# Patient Record
Sex: MALE | Race: WHITE | NOT HISPANIC OR LATINO | URBAN - METROPOLITAN AREA
[De-identification: names, ages, dates, MRNs, and addresses within clinical notes are randomized per-mention and may not be internally consistent; named-entity substitution may affect disease eponyms.]

---

## 2020-05-06 PROBLEM — 35489007 DEPRESSION: Status: ACTIVE | Noted: 2020-05-06

## 2020-07-14 ENCOUNTER — OFFICE VISIT (OUTPATIENT)
Dept: URBAN - METROPOLITAN AREA CLINIC 46 | Facility: CLINIC | Age: 35
End: 2020-07-14

## 2020-07-15 ENCOUNTER — OFFICE VISIT (OUTPATIENT)
Dept: URBAN - METROPOLITAN AREA CLINIC 13 | Facility: CLINIC | Age: 35
End: 2020-07-15

## 2020-07-28 ENCOUNTER — OFFICE VISIT (OUTPATIENT)
Dept: URBAN - METROPOLITAN AREA CLINIC 13 | Facility: CLINIC | Age: 35
End: 2020-07-28

## 2020-09-17 ENCOUNTER — OFFICE VISIT (OUTPATIENT)
Dept: URBAN - METROPOLITAN AREA CLINIC 44 | Facility: CLINIC | Age: 35
End: 2020-09-17

## 2020-10-01 ENCOUNTER — OFFICE VISIT (OUTPATIENT)
Dept: URBAN - METROPOLITAN AREA CLINIC 44 | Facility: CLINIC | Age: 35
End: 2020-10-01

## 2020-10-01 ENCOUNTER — OFFICE VISIT (OUTPATIENT)
Dept: URBAN - METROPOLITAN AREA CLINIC 46 | Facility: CLINIC | Age: 35
End: 2020-10-01

## 2020-11-12 ENCOUNTER — OFFICE VISIT (OUTPATIENT)
Dept: URBAN - METROPOLITAN AREA CLINIC 44 | Facility: CLINIC | Age: 35
End: 2020-11-12

## 2021-01-07 ENCOUNTER — OFFICE VISIT (OUTPATIENT)
Dept: URBAN - METROPOLITAN AREA CLINIC 44 | Facility: CLINIC | Age: 36
End: 2021-01-07

## 2021-01-07 ENCOUNTER — OFFICE VISIT (OUTPATIENT)
Dept: URBAN - METROPOLITAN AREA CLINIC 46 | Facility: CLINIC | Age: 36
End: 2021-01-07

## 2021-01-14 ENCOUNTER — OFFICE VISIT (OUTPATIENT)
Dept: URBAN - METROPOLITAN AREA CLINIC 44 | Facility: CLINIC | Age: 36
End: 2021-01-14

## 2021-01-27 ENCOUNTER — OFFICE VISIT (OUTPATIENT)
Dept: URBAN - METROPOLITAN AREA CLINIC 44 | Facility: CLINIC | Age: 36
End: 2021-01-27

## 2021-03-02 ENCOUNTER — OFFICE VISIT (OUTPATIENT)
Dept: URBAN - METROPOLITAN AREA CLINIC 13 | Facility: CLINIC | Age: 36
End: 2021-03-02

## 2021-03-16 ENCOUNTER — OFFICE VISIT (OUTPATIENT)
Dept: URBAN - METROPOLITAN AREA CLINIC 44 | Facility: CLINIC | Age: 36
End: 2021-03-16

## 2021-03-18 ENCOUNTER — OFFICE VISIT (OUTPATIENT)
Dept: URBAN - METROPOLITAN AREA CLINIC 13 | Facility: CLINIC | Age: 36
End: 2021-03-18

## 2021-03-25 ENCOUNTER — OFFICE VISIT (OUTPATIENT)
Dept: URBAN - METROPOLITAN AREA CLINIC 44 | Facility: CLINIC | Age: 36
End: 2021-03-25

## 2021-03-26 ENCOUNTER — OFFICE VISIT (OUTPATIENT)
Dept: URBAN - METROPOLITAN AREA CLINIC 13 | Facility: CLINIC | Age: 36
End: 2021-03-26

## 2021-04-14 ENCOUNTER — OFFICE VISIT (OUTPATIENT)
Dept: URBAN - METROPOLITAN AREA CLINIC 44 | Facility: CLINIC | Age: 36
End: 2021-04-14

## 2021-04-20 ENCOUNTER — OFFICE VISIT (OUTPATIENT)
Dept: URBAN - METROPOLITAN AREA CLINIC 44 | Facility: CLINIC | Age: 36
End: 2021-04-20

## 2021-06-16 ENCOUNTER — OFFICE VISIT (OUTPATIENT)
Dept: URBAN - METROPOLITAN AREA CLINIC 44 | Facility: CLINIC | Age: 36
End: 2021-06-16

## 2021-07-13 ENCOUNTER — OFFICE VISIT (OUTPATIENT)
Dept: URBAN - METROPOLITAN AREA CLINIC 44 | Facility: CLINIC | Age: 36
End: 2021-07-13

## 2021-08-28 ENCOUNTER — TELEPHONE ENCOUNTER (OUTPATIENT)
Dept: URBAN - METROPOLITAN AREA CLINIC 13 | Facility: CLINIC | Age: 36
End: 2021-08-28

## 2021-08-28 RX ORDER — SERTRALINE 50 MG/1
TABLET, FILM COATED ORAL
OUTPATIENT
End: 2020-07-14

## 2021-08-28 RX ORDER — COLESTIPOL HYDROCHLORIDE 1 G/1
TABLET ORAL
OUTPATIENT
Start: 2019-08-08 | End: 2019-08-23

## 2021-08-28 RX ORDER — CIPROFLOXACIN 750 MG/1
TABLET, FILM COATED ORAL
OUTPATIENT
Start: 2020-03-11 | End: 2020-05-06

## 2021-08-28 RX ORDER — DIPHENOXYLATE HYDROCHLORIDE AND ATROPINE SULFATE 2.5; .025 MG/1; MG/1
TABLET ORAL
OUTPATIENT
Start: 2019-08-08 | End: 2019-08-23

## 2021-08-29 ENCOUNTER — TELEPHONE ENCOUNTER (OUTPATIENT)
Dept: URBAN - METROPOLITAN AREA CLINIC 13 | Facility: CLINIC | Age: 36
End: 2021-08-29

## 2021-08-29 RX ORDER — CYANOCOBALAMIN 1000 UG/ML
INJECTION INTRAMUSCULAR; SUBCUTANEOUS
Status: ACTIVE | COMMUNITY

## 2021-08-29 RX ORDER — MULTIVIT-MIN/FOLIC/VIT K/LYCOP 400-300MCG
TABLET ORAL
Status: ACTIVE | COMMUNITY

## 2021-08-29 RX ORDER — PANTOPRAZOLE SODIUM 40 MG/1
TABLET, DELAYED RELEASE ORAL
Status: ACTIVE | COMMUNITY

## 2021-08-29 RX ORDER — PANTOPRAZOLE SODIUM 40 MG/1
TABLET, DELAYED RELEASE ORAL
Status: ACTIVE | COMMUNITY
Start: 2021-03-16

## 2021-08-29 RX ORDER — DIPHENOXYLATE HCL/ATROPINE 2.5-.025MG
TABLET ORAL
Status: ACTIVE | COMMUNITY

## 2021-08-29 RX ORDER — PREDNISONE 10 MG/1
TABLET ORAL
Status: ACTIVE | COMMUNITY
Start: 2021-03-16

## 2021-08-29 RX ORDER — ERGOCALCIFEROL 1.25 MG/1
CAPSULE ORAL
Status: ACTIVE | COMMUNITY

## 2021-08-29 RX ORDER — COLESTIPOL HYDROCHLORIDE 1 G/1
TABLET ORAL
Status: ACTIVE | COMMUNITY

## 2021-08-29 RX ORDER — POTASSIUM CHLORIDE 1500 MG/1
TABLET, FILM COATED, EXTENDED RELEASE ORAL
Status: ACTIVE | COMMUNITY

## 2021-08-29 RX ORDER — VENLAFAXINE 37.5 MG/1
TABLET ORAL
Status: ACTIVE | COMMUNITY

## 2021-09-10 PROBLEM — 50440006 CROHN'S DISEASE OF COLON: Status: ACTIVE | Noted: 2021-09-02

## 2021-09-13 ENCOUNTER — OFFICE VISIT (OUTPATIENT)
Dept: URBAN - METROPOLITAN AREA CLINIC 44 | Facility: CLINIC | Age: 36
End: 2021-09-13

## 2021-09-15 ENCOUNTER — OFFICE VISIT (OUTPATIENT)
Dept: URBAN - METROPOLITAN AREA CLINIC 43 | Facility: CLINIC | Age: 36
End: 2021-09-15
Payer: COMMERCIAL

## 2021-09-15 VITALS
WEIGHT: 165.6 LBS | TEMPERATURE: 97.5 F | SYSTOLIC BLOOD PRESSURE: 96 MMHG | HEIGHT: 73 IN | BODY MASS INDEX: 21.95 KG/M2 | HEART RATE: 61 BPM | RESPIRATION RATE: 18 BRPM | DIASTOLIC BLOOD PRESSURE: 57 MMHG

## 2021-09-15 DIAGNOSIS — K50.80 CROHN'S COLITIS: ICD-10-CM

## 2021-09-15 PROCEDURE — 96413 CHEMO IV INFUSION 1 HR: CPT | Performed by: INTERNAL MEDICINE

## 2021-09-15 RX ORDER — PREDNISONE 10 MG/1
TABLET ORAL
Status: ACTIVE | COMMUNITY
Start: 2021-03-16

## 2021-09-15 RX ORDER — CYANOCOBALAMIN 1000 UG/ML
INJECTION INTRAMUSCULAR; SUBCUTANEOUS
Status: ACTIVE | COMMUNITY

## 2021-09-15 RX ORDER — ERGOCALCIFEROL 1.25 MG/1
CAPSULE ORAL
Status: ACTIVE | COMMUNITY

## 2021-09-15 RX ORDER — DIPHENOXYLATE HCL/ATROPINE 2.5-.025MG
TABLET ORAL
Status: ACTIVE | COMMUNITY

## 2021-09-15 RX ORDER — POTASSIUM CHLORIDE 1500 MG/1
TABLET, FILM COATED, EXTENDED RELEASE ORAL
Status: ACTIVE | COMMUNITY

## 2021-09-15 RX ORDER — PANTOPRAZOLE SODIUM 40 MG/1
TABLET, DELAYED RELEASE ORAL
Status: ACTIVE | COMMUNITY
Start: 2021-03-16

## 2021-09-15 RX ORDER — MULTIVIT-MIN/FOLIC/VIT K/LYCOP 400-300MCG
TABLET ORAL
Status: ACTIVE | COMMUNITY

## 2021-09-15 RX ORDER — VENLAFAXINE 37.5 MG/1
TABLET ORAL
Status: ACTIVE | COMMUNITY

## 2021-09-15 RX ORDER — COLESTIPOL HYDROCHLORIDE 1 G/1
TABLET ORAL
Status: ACTIVE | COMMUNITY

## 2021-09-17 ENCOUNTER — OFFICE VISIT (OUTPATIENT)
Dept: URBAN - METROPOLITAN AREA CLINIC 44 | Facility: CLINIC | Age: 36
End: 2021-09-17

## 2021-09-27 ENCOUNTER — OFFICE VISIT (OUTPATIENT)
Dept: URBAN - METROPOLITAN AREA CLINIC 44 | Facility: CLINIC | Age: 36
End: 2021-09-27
Payer: COMMERCIAL

## 2021-09-27 VITALS
BODY MASS INDEX: 21.76 KG/M2 | HEART RATE: 65 BPM | TEMPERATURE: 99.2 F | WEIGHT: 164.2 LBS | HEIGHT: 73 IN | OXYGEN SATURATION: 98 % | DIASTOLIC BLOOD PRESSURE: 60 MMHG | SYSTOLIC BLOOD PRESSURE: 100 MMHG

## 2021-09-27 DIAGNOSIS — K75.4 AUTOIMMUNE HEPATITIS: ICD-10-CM

## 2021-09-27 DIAGNOSIS — K50.018 CROHN'S DISEASE OF SMALL INTESTINE WITH OTHER COMPLICATION: ICD-10-CM

## 2021-09-27 PROCEDURE — 99214 OFFICE O/P EST MOD 30 MIN: CPT | Performed by: INTERNAL MEDICINE

## 2021-09-27 RX ORDER — POTASSIUM CHLORIDE 1500 MG/1
TABLET, FILM COATED, EXTENDED RELEASE ORAL
Status: ACTIVE | COMMUNITY

## 2021-09-27 RX ORDER — VENLAFAXINE 37.5 MG/1
TABLET ORAL
Status: ON HOLD | COMMUNITY

## 2021-09-27 RX ORDER — ERGOCALCIFEROL 1.25 MG/1
CAPSULE ORAL
Status: ACTIVE | COMMUNITY

## 2021-09-27 RX ORDER — COLESTIPOL HYDROCHLORIDE 1 G/1
TABLET ORAL
Status: ON HOLD | COMMUNITY

## 2021-09-27 RX ORDER — CYANOCOBALAMIN 1000 UG/ML
INJECTION INTRAMUSCULAR; SUBCUTANEOUS
Status: ACTIVE | COMMUNITY

## 2021-09-27 RX ORDER — PREDNISONE 10 MG/1
TABLET ORAL
Status: ON HOLD | COMMUNITY
Start: 2021-03-16

## 2021-09-27 RX ORDER — PREDNISONE 10 MG/1
1 TABLET TABLET ORAL ONCE A DAY
Qty: 30 | OUTPATIENT
Start: 2021-09-27 | End: 2021-10-27

## 2021-09-27 RX ORDER — MULTIVIT-MIN/FOLIC/VIT K/LYCOP 400-300MCG
TABLET ORAL
Status: ON HOLD | COMMUNITY

## 2021-09-27 RX ORDER — DIPHENOXYLATE HCL/ATROPINE 2.5-.025MG
TABLET ORAL
Status: ON HOLD | COMMUNITY

## 2021-09-27 RX ORDER — PANTOPRAZOLE SODIUM 40 MG/1
TABLET, DELAYED RELEASE ORAL
Status: ON HOLD | COMMUNITY
Start: 2021-03-16

## 2021-09-27 NOTE — HPI-TODAY'S VISIT:
36 yo P with a ?history of AIH and Crohn's disease, and medical non-compliance. The patient was admitted in 6/28/19 to [facility] for possible Crohn's flare. He began a work up at [place] prior to most recent insurance change and we do not have all of his records. His hepatologist was Jeffry at [place]. ASMA, MADISON were completed but no IgG was done. He reported prior liver biopsy that confirmed AIH but he did not take steroids or Imuran. History of SB resection in 2007 and 2010 and had a subtotal colectomy with ileostomy and rectal pouch in 2014. The patient states he had a flexible sigmoidoscopy in 2017 but we do not have these records. There was a period of time in which he was off of all IBD medications, but now on Entyvio. History of being on Remicade and Cimzia in the past. The patient has had elevated LFTs as well as Gilbert's in the past. Labs per [facility] (2017) include a negative viral hepatitis (A/B/C/E), CMV, EBV, HSV. At that time he had an unremarkable MRCP which only showed mild splenomegaly. 6/2019 US Doppler of liver and spleen showed unremarkable liver and mild splenomegaly. Most recently, he has been evaluated by Dr. Light previously and on his most recent appointment with him he questioned the dx of AIH.   The patient expressed frustration as he feels he has an unclear diagnosis regarding the liver disease; not on any meds specifically for this at this time.     EGD/Flex sigmoidoscopy 8/2019 revealed a small ulcer in the small bowel; biopsies with active inflammation.  In addition, he has been on multiple steroid tapers with He also has had multiple ER visits with admission for diffuse abdominal pain and pancreatitis (lipase and epigastric pain); last ER visit 10/2020. EUS with Dr. Sebastien Tellez was grossly unremarkable (idiopathic pancreatitis).   He reports that about a week or so ago, he had a couple of days of N/V which made him feel dehydrated. He continues to feel week, but appetite is improving.  At first, he thought he may have been having a PSBO. He denies any abdominal pain today, but has had some off and on. Stool output was increased, but seems to be returning to baseline; denies blood in the stool. He lost a few pounds with the attack of vomiting, but overall has been pretty stable. He has used Aleve occasionally for joint pain, but no regular NSAID use.  Overall, Entyvio has been working pretty well for him, and he is continues his Q8 schedule.

## 2021-11-19 ENCOUNTER — OFFICE VISIT (OUTPATIENT)
Dept: URBAN - METROPOLITAN AREA CLINIC 44 | Facility: CLINIC | Age: 36
End: 2021-11-19

## 2022-01-19 ENCOUNTER — OFFICE VISIT (OUTPATIENT)
Dept: URBAN - METROPOLITAN AREA CLINIC 43 | Facility: CLINIC | Age: 37
End: 2022-01-19
Payer: COMMERCIAL

## 2022-01-19 VITALS
DIASTOLIC BLOOD PRESSURE: 55 MMHG | RESPIRATION RATE: 18 BRPM | WEIGHT: 166.4 LBS | HEIGHT: 73 IN | HEART RATE: 67 BPM | BODY MASS INDEX: 22.05 KG/M2 | TEMPERATURE: 98.8 F | SYSTOLIC BLOOD PRESSURE: 102 MMHG

## 2022-01-19 DIAGNOSIS — K50.80 CROHN'S COLITIS: ICD-10-CM

## 2022-01-19 PROCEDURE — 96413 CHEMO IV INFUSION 1 HR: CPT | Performed by: INTERNAL MEDICINE

## 2022-01-19 RX ORDER — COLESTIPOL HYDROCHLORIDE 1 G/1
TABLET ORAL
Status: ON HOLD | COMMUNITY

## 2022-01-19 RX ORDER — MULTIVIT-MIN/FOLIC/VIT K/LYCOP 400-300MCG
TABLET ORAL
Status: ON HOLD | COMMUNITY

## 2022-01-19 RX ORDER — CYANOCOBALAMIN 1000 UG/ML
INJECTION INTRAMUSCULAR; SUBCUTANEOUS
Status: ACTIVE | COMMUNITY

## 2022-01-19 RX ORDER — DIPHENOXYLATE HCL/ATROPINE 2.5-.025MG
TABLET ORAL
Status: ON HOLD | COMMUNITY

## 2022-01-19 RX ORDER — POTASSIUM CHLORIDE 1500 MG/1
TABLET, FILM COATED, EXTENDED RELEASE ORAL
Status: ACTIVE | COMMUNITY

## 2022-01-19 RX ORDER — ERGOCALCIFEROL 1.25 MG/1
CAPSULE ORAL
Status: ACTIVE | COMMUNITY

## 2022-01-19 RX ORDER — PANTOPRAZOLE SODIUM 40 MG/1
TABLET, DELAYED RELEASE ORAL
Status: ON HOLD | COMMUNITY
Start: 2021-03-16

## 2022-01-19 RX ORDER — VENLAFAXINE 37.5 MG/1
TABLET ORAL
Status: ON HOLD | COMMUNITY

## 2022-01-19 RX ORDER — PREDNISONE 10 MG/1
TABLET ORAL
Status: ON HOLD | COMMUNITY
Start: 2021-03-16

## 2022-01-24 ENCOUNTER — WEB ENCOUNTER (OUTPATIENT)
Dept: URBAN - METROPOLITAN AREA CLINIC 44 | Facility: CLINIC | Age: 37
End: 2022-01-24

## 2022-02-09 ENCOUNTER — WEB ENCOUNTER (OUTPATIENT)
Dept: URBAN - METROPOLITAN AREA CLINIC 44 | Facility: CLINIC | Age: 37
End: 2022-02-09

## 2022-02-11 ENCOUNTER — OFFICE VISIT (OUTPATIENT)
Dept: URBAN - METROPOLITAN AREA CLINIC 48 | Facility: CLINIC | Age: 37
End: 2022-02-11
Payer: COMMERCIAL

## 2022-02-11 ENCOUNTER — LAB OUTSIDE AN ENCOUNTER (OUTPATIENT)
Dept: URBAN - METROPOLITAN AREA CLINIC 48 | Facility: CLINIC | Age: 37
End: 2022-02-11

## 2022-02-11 VITALS
WEIGHT: 156.2 LBS | HEIGHT: 73 IN | SYSTOLIC BLOOD PRESSURE: 118 MMHG | BODY MASS INDEX: 20.7 KG/M2 | OXYGEN SATURATION: 98 % | HEART RATE: 87 BPM | TEMPERATURE: 99.4 F | DIASTOLIC BLOOD PRESSURE: 80 MMHG

## 2022-02-11 DIAGNOSIS — R10.9 LEFT SIDED ABDOMINAL PAIN: ICD-10-CM

## 2022-02-11 DIAGNOSIS — R63.4 WEIGHT LOSS: ICD-10-CM

## 2022-02-11 DIAGNOSIS — R11.14 BILIOUS VOMITING WITH NAUSEA: ICD-10-CM

## 2022-02-11 DIAGNOSIS — K50.018 CROHN'S DISEASE OF SMALL INTESTINE WITH OTHER COMPLICATION: ICD-10-CM

## 2022-02-11 DIAGNOSIS — R19.8 ALTERED BOWEL FUNCTION: ICD-10-CM

## 2022-02-11 DIAGNOSIS — K75.4 AUTOIMMUNE HEPATITIS: ICD-10-CM

## 2022-02-11 PROCEDURE — 99215 OFFICE O/P EST HI 40 MIN: CPT | Performed by: PHYSICIAN ASSISTANT

## 2022-02-11 RX ORDER — ERGOCALCIFEROL 1.25 MG/1
CAPSULE ORAL
Status: ACTIVE | COMMUNITY

## 2022-02-11 RX ORDER — MULTIVIT-MIN/FOLIC/VIT K/LYCOP 400-300MCG
TABLET ORAL
Status: ACTIVE | COMMUNITY

## 2022-02-11 RX ORDER — METRONIDAZOLE 500 MG/1
1 TABLET TABLET ORAL THREE TIMES A DAY
Qty: 30 | OUTPATIENT
Start: 2022-02-11 | End: 2022-02-21

## 2022-02-11 RX ORDER — POTASSIUM CHLORIDE 1500 MG/1
TABLET, FILM COATED, EXTENDED RELEASE ORAL
Status: ACTIVE | COMMUNITY

## 2022-02-11 RX ORDER — COLESTIPOL HYDROCHLORIDE 1 G/1
TABLET ORAL
Status: ON HOLD | COMMUNITY

## 2022-02-11 RX ORDER — VENLAFAXINE 37.5 MG/1
TABLET ORAL
Status: ON HOLD | COMMUNITY

## 2022-02-11 RX ORDER — PREDNISONE 10 MG/1
AS DIRECTED TABLET ORAL DAILY
Qty: 40 | Refills: 1 | OUTPATIENT
Start: 2022-02-11 | End: 2022-03-15

## 2022-02-11 RX ORDER — CYANOCOBALAMIN 1000 UG/ML
INJECTION INTRAMUSCULAR; SUBCUTANEOUS
Status: ACTIVE | COMMUNITY

## 2022-02-11 RX ORDER — PANTOPRAZOLE SODIUM 40 MG/1
TABLET, DELAYED RELEASE ORAL
Status: ON HOLD | COMMUNITY
Start: 2021-03-16

## 2022-02-11 RX ORDER — DIPHENOXYLATE HCL/ATROPINE 2.5-.025MG
TABLET ORAL
Status: ON HOLD | COMMUNITY

## 2022-02-11 RX ORDER — PANTOPRAZOLE SODIUM 40 MG/1
1 TABLET TABLET, DELAYED RELEASE ORAL
Qty: 30 | Refills: 3 | OUTPATIENT
Start: 2022-02-11

## 2022-02-11 RX ORDER — LACTOSE-REDUCED FOOD
AS DIRECTED LIQUID (ML) ORAL
Status: ACTIVE | COMMUNITY

## 2022-02-11 RX ORDER — CIPROFLOXACIN HYDROCHLORIDE 500 MG/1
1 TABLET TABLET, FILM COATED ORAL
Qty: 20 | OUTPATIENT
Start: 2022-02-11 | End: 2022-02-21

## 2022-02-11 RX ORDER — PREDNISONE 10 MG/1
TABLET ORAL
Status: ON HOLD | COMMUNITY
Start: 2021-03-16

## 2022-02-11 NOTE — HPI-TODAY'S VISIT:
37 y/o P with a ?history of AIH and Crohn's disease, and medical non-compliance. The patient was admitted in 6/28/19 to [facility] for possible Crohn's flare. He began a work up at [place] prior to most recent insurance change and we do not have all of his records. His hepatologist was Jeffry at [place]. Prior ASMA (2/2020), MADISON  and Ceruloplasmin (8/2017) and IgG (6/2021) have all been negative. He reported prior liver biopsy that confirmed AIH but he did not take steroids or Imuran; path results in EPIC chart from 2/2020 showed liver core biopsy with minimal portal chronic inflammation; bile ducts intact; minimal steatosis (less than 5%), no fibrosis appreciated; minimal iron within Kupffer cells; no lobular inflammation, hepatocecullar ballooning degeneration or necrosis.   History of SB resection in 2007 and 2010 and had a subtotal colectomy with ileostomy and rectal pouch in 2014. There was a period of time in which he was off of all IBD medications, but now on Entyvio. History of being on Remicade and Cimzia in the past. The patient has had elevated LFTs as well as Gilbert's in the past. Labs per [facility] (2017) include a negative viral hepatitis (A/B/C/E), CMV, EBV, HSV. At that time he had an unremarkable MRCP which only showed mild splenomegaly. 6/2019 US Doppler of liver and spleen showed unremarkable liver and mild splenomegaly. He was then evaluated by Dr. Light and the patient reported he questioned the dx of AIH.   EGD/Flex sigmoidoscopy 8/2019 revealed a small ulcer in the small bowel; biopsies with active inflammation.  In addition, he has been on multiple steroid tapers with He also has had multiple ER visits with admission for diffuse abdominal pain and pancreatitis (lipase and epigastric pain). EUS with Dr. Sebastien Tellez was grossly unremarkable (idiopathic pancreatitis).   He was seen in September in the office last and at that time he weighed 164 pounds. He was having some N/V and fatigue. Drug levels and antibodies for Entyvio were ordered, as well as iron studies which he did not have done. He was given 10 mg Prednisone daily for 30 days. He returns today and states he did well for about two months, and since December has been having episodic attacks of self limited, but severe N/V and left sided abdominal pain, usually lasting a few days at a time. He is feeling weak and fatigued. He has recently had more stool output than he was with the vomiting, but stool is very foul smelling; no blood. He had labs drawn 2/3 with a normal CRP of 1, normal CBC, and normal CMP aside from stable TBili of 3.1 and Alk phos of 135. Today he weighs 156 pounds. He has had no recent abdominal imaging and a CT enterography was ordered last year in March which he did not have done.

## 2022-02-11 NOTE — PHYSICAL EXAM GASTROINTESTINAL
Abdomen , soft, nontender, nondistended , no guarding or rigidity , no masses palpable , hyperactive bowel sounds , Liver and Spleen , no hepatomegaly present , no hepatosplenomegaly , liver nontender , spleen not palpable , Rectal , normal sphincter tone , no internal hemorrhoids, rectal masses or bleeding present

## 2022-02-15 ENCOUNTER — TELEPHONE ENCOUNTER (OUTPATIENT)
Dept: URBAN - METROPOLITAN AREA CLINIC 40 | Facility: CLINIC | Age: 37
End: 2022-02-15

## 2022-02-21 ENCOUNTER — WEB ENCOUNTER (OUTPATIENT)
Dept: URBAN - METROPOLITAN AREA CLINIC 44 | Facility: CLINIC | Age: 37
End: 2022-02-21

## 2022-03-03 LAB
ANTI-VEDOLIZUMAB ANTIBODY: <25
VEDOLIZUMAB: 16

## 2022-03-07 ENCOUNTER — TELEPHONE ENCOUNTER (OUTPATIENT)
Dept: URBAN - METROPOLITAN AREA CLINIC 48 | Facility: CLINIC | Age: 37
End: 2022-03-07

## 2022-03-11 ENCOUNTER — CLAIMS CREATED FROM THE CLAIM WINDOW (OUTPATIENT)
Dept: URBAN - METROPOLITAN AREA CLINIC 46 | Facility: CLINIC | Age: 37
End: 2022-03-11
Payer: COMMERCIAL

## 2022-03-11 VITALS
WEIGHT: 155.6 LBS | DIASTOLIC BLOOD PRESSURE: 71 MMHG | SYSTOLIC BLOOD PRESSURE: 115 MMHG | HEART RATE: 66 BPM | OXYGEN SATURATION: 99 % | BODY MASS INDEX: 20.62 KG/M2 | HEIGHT: 73 IN | TEMPERATURE: 98.7 F

## 2022-03-11 DIAGNOSIS — K50.018 CROHN'S DISEASE OF SMALL INTESTINE WITH OTHER COMPLICATION: ICD-10-CM

## 2022-03-11 DIAGNOSIS — K75.4 AUTOIMMUNE HEPATITIS: ICD-10-CM

## 2022-03-11 DIAGNOSIS — R93.5 ABNORMAL CT OF THE ABDOMEN: ICD-10-CM

## 2022-03-11 DIAGNOSIS — R10.9 LEFT SIDED ABDOMINAL PAIN: ICD-10-CM

## 2022-03-11 DIAGNOSIS — R11.14 BILIOUS VOMITING WITH NAUSEA: ICD-10-CM

## 2022-03-11 PROCEDURE — 99214 OFFICE O/P EST MOD 30 MIN: CPT | Performed by: INTERNAL MEDICINE

## 2022-03-11 PROCEDURE — 99214 OFFICE O/P EST MOD 30 MIN: CPT | Performed by: PHYSICIAN ASSISTANT

## 2022-03-11 RX ORDER — MULTIVIT-MIN/FOLIC/VIT K/LYCOP 400-300MCG
TABLET ORAL
Status: ACTIVE | COMMUNITY

## 2022-03-11 RX ORDER — CYANOCOBALAMIN 1000 UG/ML
INJECTION INTRAMUSCULAR; SUBCUTANEOUS
Status: ACTIVE | COMMUNITY

## 2022-03-11 RX ORDER — VENLAFAXINE 37.5 MG/1
TABLET ORAL
Status: ACTIVE | COMMUNITY

## 2022-03-11 RX ORDER — PANTOPRAZOLE SODIUM 40 MG/1
1 TABLET TABLET, DELAYED RELEASE ORAL
Qty: 30 | Refills: 3 | Status: ACTIVE | COMMUNITY
Start: 2022-02-11

## 2022-03-11 RX ORDER — LACTOSE-REDUCED FOOD
AS DIRECTED LIQUID (ML) ORAL
Status: ACTIVE | COMMUNITY

## 2022-03-11 RX ORDER — PREDNISONE 10 MG/1
AS DIRECTED TABLET ORAL DAILY
Qty: 40 | Refills: 1 | Status: ACTIVE | COMMUNITY
Start: 2022-02-11 | End: 2022-03-15

## 2022-03-11 RX ORDER — POTASSIUM CHLORIDE 1500 MG/1
TABLET, FILM COATED, EXTENDED RELEASE ORAL
Status: ACTIVE | COMMUNITY

## 2022-03-11 RX ORDER — ERGOCALCIFEROL 1.25 MG/1
CAPSULE ORAL
Status: ACTIVE | COMMUNITY

## 2022-03-11 RX ORDER — PANTOPRAZOLE SODIUM 40 MG/1
1 TABLET TABLET, DELAYED RELEASE ORAL
OUTPATIENT
Start: 2022-02-11

## 2022-03-11 NOTE — PHYSICAL EXAM CONSTITUTIONAL:
well developed, thin , in no acute distress , ambulating without difficulty , normal communication ability

## 2022-03-11 NOTE — HPI-TODAY'S VISIT:
37 y/o P with a ?history of AIH and Crohn's disease, and medical non-compliance. The patient was admitted in 6/28/19 to [facility] for possible Crohn's flare. He began a work up at [place] prior to most recent insurance change and we do not have all of his records. His hepatologist was Jeffry at [place]. Prior ASMA (2/2020), MADISON  and Ceruloplasmin (8/2017) and IgG (6/2021) have all been negative. He reported prior liver biopsy that confirmed AIH but he did not take steroids or Imuran; path results in EPIC chart from 2/2020 showed liver core biopsy with minimal portal chronic inflammation; bile ducts intact; minimal steatosis (less than 5%), no fibrosis appreciated; minimal iron within Kupffer cells; no lobular inflammation, hepatocecullar ballooning degeneration or necrosis.   History of SB resection in 2007 and 2010 and had a subtotal colectomy with ileostomy and rectal pouch in 2014. There was a period of time in which he was off of all IBD medications, but now on Entyvio. History of being on Remicade and Cimzia in the past. The patient has had elevated LFTs as well as Gilbert's in the past. Labs per [facility] (2017) include a negative viral hepatitis (A/B/C/E), CMV, EBV, HSV. At that time he had an unremarkable MRCP which only showed mild splenomegaly. 6/2019 US Doppler of liver and spleen showed unremarkable liver and mild splenomegaly. He was then evaluated by Dr. Light and the patient reported he questioned the dx of AIH.   EGD/Flex sigmoidoscopy 8/2019 revealed a small ulcer in the small bowel; biopsies with active inflammation.  In addition, he has been on multiple steroid tapers with He also has had multiple ER visits with admission for diffuse abdominal pain and pancreatitis (lipase and epigastric pain). EUS with Dr. Sebastien Tellez was grossly unremarkable (idiopathic pancreatitis).   He was seen in September in the office last and at that time he weighed 164 pounds. He was having some N/V and fatigue. Drug levels and antibodies for Entyvio were ordered, as well as iron studies which he did not have done. He was given 10 mg Prednisone daily for 30 days. At last visit 2/11/22 he reported that he well for about two months, and since December has been having episodic attacks of self limited, but severe N/V and left sided abdominal pain, usually lasting a few days at a time. He is feeling weak and fatigued. He has recently had more stool output than he was with the vomiting, but stool is very foul smelling; no blood. He had labs drawn 2/3 with a normal CRP of 1, normal CBC, and normal CMP aside from stable TBili of 3.1 and Alk phos of 135. Today he weighs 156 pounds. He has had no recent abdominal imaging and a CT enterography was ordered last year in March which he did not have done. He was given Cipro/Flagyl, and a Prednisone taper.  Stool studies were checked and negative for pathogens; fecal calpro and panc elastase normal. Stelara drug levels were good and no Ab. CTE showed segements of thickening in the SB upstream from the colostomy site, measuring 6 cm in length. He returns today and reports no significant change. Still having N/V, abdominal pain, and intermittent foul smelling diarrhea.

## 2022-03-16 ENCOUNTER — OFFICE VISIT (OUTPATIENT)
Dept: URBAN - METROPOLITAN AREA SURGERY CENTER 28 | Facility: SURGERY CENTER | Age: 37
End: 2022-03-16
Payer: COMMERCIAL

## 2022-03-16 ENCOUNTER — TELEPHONE ENCOUNTER (OUTPATIENT)
Dept: URBAN - METROPOLITAN AREA CLINIC 92 | Facility: CLINIC | Age: 37
End: 2022-03-16

## 2022-03-16 DIAGNOSIS — K63.3 APHTHOUS ULCER OF COLON: ICD-10-CM

## 2022-03-16 DIAGNOSIS — K50.012 CROHN'S DISEASE OF DUODENUM WITH INTESTINAL OBSTRUCTION: ICD-10-CM

## 2022-03-16 DIAGNOSIS — R10.13 ABDOMINAL DISCOMFORT, EPIGASTRIC: ICD-10-CM

## 2022-03-16 DIAGNOSIS — K62.4 ACQUIRED ANAL STENOSIS: ICD-10-CM

## 2022-03-16 PROCEDURE — 45378 DIAGNOSTIC COLONOSCOPY: CPT | Performed by: INTERNAL MEDICINE

## 2022-03-16 PROCEDURE — 43235 EGD DIAGNOSTIC BRUSH WASH: CPT | Performed by: INTERNAL MEDICINE

## 2022-03-16 PROCEDURE — G8907 PT DOC NO EVENTS ON DISCHARG: HCPCS | Performed by: INTERNAL MEDICINE

## 2022-03-16 RX ORDER — PANTOPRAZOLE SODIUM 40 MG/1
1 TABLET TABLET, DELAYED RELEASE ORAL
Status: ACTIVE | COMMUNITY
Start: 2022-02-11

## 2022-03-16 RX ORDER — MULTIVIT-MIN/FOLIC/VIT K/LYCOP 400-300MCG
TABLET ORAL
Status: ACTIVE | COMMUNITY

## 2022-03-16 RX ORDER — CYANOCOBALAMIN 1000 UG/ML
INJECTION INTRAMUSCULAR; SUBCUTANEOUS
Status: ACTIVE | COMMUNITY

## 2022-03-16 RX ORDER — ERGOCALCIFEROL 1.25 MG/1
CAPSULE ORAL
Status: ACTIVE | COMMUNITY

## 2022-03-16 RX ORDER — LACTOSE-REDUCED FOOD
AS DIRECTED LIQUID (ML) ORAL
Status: ACTIVE | COMMUNITY

## 2022-03-16 RX ORDER — PREDNISONE 10 MG/1
4 TABLETS AS DIRECTED TABLET ORAL
Qty: 50 | Refills: 0 | OUTPATIENT
Start: 2022-03-16

## 2022-03-16 RX ORDER — POTASSIUM CHLORIDE 1500 MG/1
TABLET, FILM COATED, EXTENDED RELEASE ORAL
Status: ACTIVE | COMMUNITY

## 2022-03-16 RX ORDER — VENLAFAXINE 37.5 MG/1
TABLET ORAL
Status: ACTIVE | COMMUNITY

## 2022-03-22 ENCOUNTER — OFFICE VISIT (OUTPATIENT)
Dept: URBAN - METROPOLITAN AREA CLINIC 43 | Facility: CLINIC | Age: 37
End: 2022-03-22
Payer: COMMERCIAL

## 2022-03-22 VITALS
TEMPERATURE: 99 F | RESPIRATION RATE: 18 BRPM | SYSTOLIC BLOOD PRESSURE: 126 MMHG | HEART RATE: 82 BPM | HEIGHT: 73 IN | BODY MASS INDEX: 20.49 KG/M2 | WEIGHT: 154.6 LBS | DIASTOLIC BLOOD PRESSURE: 84 MMHG

## 2022-03-22 DIAGNOSIS — K50.80 CROHN'S COLITIS: ICD-10-CM

## 2022-03-22 PROCEDURE — 96413 CHEMO IV INFUSION 1 HR: CPT | Performed by: INTERNAL MEDICINE

## 2022-03-22 RX ORDER — POTASSIUM CHLORIDE 1500 MG/1
TABLET, FILM COATED, EXTENDED RELEASE ORAL
Status: ACTIVE | COMMUNITY

## 2022-03-22 RX ORDER — PREDNISONE 10 MG/1
4 TABLETS AS DIRECTED TABLET ORAL
Qty: 50 | Refills: 0 | Status: ACTIVE | COMMUNITY
Start: 2022-03-16

## 2022-03-22 RX ORDER — CYANOCOBALAMIN 1000 UG/ML
INJECTION INTRAMUSCULAR; SUBCUTANEOUS
Status: ACTIVE | COMMUNITY

## 2022-03-22 RX ORDER — MULTIVIT-MIN/FOLIC/VIT K/LYCOP 400-300MCG
TABLET ORAL
Status: ACTIVE | COMMUNITY

## 2022-03-22 RX ORDER — LACTOSE-REDUCED FOOD
AS DIRECTED LIQUID (ML) ORAL
Status: ACTIVE | COMMUNITY

## 2022-03-22 RX ORDER — ERGOCALCIFEROL 1.25 MG/1
CAPSULE ORAL
Status: ACTIVE | COMMUNITY

## 2022-03-22 RX ORDER — PANTOPRAZOLE SODIUM 40 MG/1
1 TABLET TABLET, DELAYED RELEASE ORAL
Status: ACTIVE | COMMUNITY
Start: 2022-02-11

## 2022-03-22 RX ORDER — VENLAFAXINE 37.5 MG/1
TABLET ORAL
Status: ACTIVE | COMMUNITY

## 2022-04-04 ENCOUNTER — TELEPHONE ENCOUNTER (OUTPATIENT)
Dept: URBAN - METROPOLITAN AREA CLINIC 46 | Facility: CLINIC | Age: 37
End: 2022-04-04

## 2022-04-06 ENCOUNTER — TELEPHONE ENCOUNTER (OUTPATIENT)
Dept: URBAN - METROPOLITAN AREA CLINIC 23 | Facility: CLINIC | Age: 37
End: 2022-04-06

## 2022-04-13 ENCOUNTER — LAB OUTSIDE AN ENCOUNTER (OUTPATIENT)
Dept: URBAN - METROPOLITAN AREA CLINIC 118 | Facility: CLINIC | Age: 37
End: 2022-04-13

## 2022-04-13 ENCOUNTER — OFFICE VISIT (OUTPATIENT)
Dept: URBAN - METROPOLITAN AREA CLINIC 118 | Facility: CLINIC | Age: 37
End: 2022-04-13

## 2022-04-13 ENCOUNTER — OFFICE VISIT (OUTPATIENT)
Dept: URBAN - METROPOLITAN AREA CLINIC 118 | Facility: CLINIC | Age: 37
End: 2022-04-13
Payer: COMMERCIAL

## 2022-04-13 VITALS
BODY MASS INDEX: 18.53 KG/M2 | WEIGHT: 139.8 LBS | DIASTOLIC BLOOD PRESSURE: 69 MMHG | SYSTOLIC BLOOD PRESSURE: 105 MMHG | HEART RATE: 62 BPM | HEIGHT: 73 IN | TEMPERATURE: 98.6 F

## 2022-04-13 DIAGNOSIS — R10.11 RUQ ABDOMINAL PAIN: ICD-10-CM

## 2022-04-13 DIAGNOSIS — R74.01 TRANSAMINITIS: ICD-10-CM

## 2022-04-13 DIAGNOSIS — E80.6 HYPERBILIRUBINEMIA: ICD-10-CM

## 2022-04-13 DIAGNOSIS — K50.018 CROHN'S DISEASE OF SMALL INTESTINE WITH OTHER COMPLICATION: ICD-10-CM

## 2022-04-13 PROBLEM — 14783006 HYPERBILIRUBINEMIA: Status: ACTIVE | Noted: 2022-04-13

## 2022-04-13 PROCEDURE — 99215 OFFICE O/P EST HI 40 MIN: CPT | Performed by: INTERNAL MEDICINE

## 2022-04-13 RX ORDER — LACTOSE-REDUCED FOOD
AS DIRECTED LIQUID (ML) ORAL
COMMUNITY

## 2022-04-13 RX ORDER — SERTRALINE HYDROCHLORIDE 50 MG/1
1 TABLET TABLET, FILM COATED ORAL ONCE A DAY
Status: ACTIVE | COMMUNITY

## 2022-04-13 RX ORDER — ERGOCALCIFEROL 1.25 MG/1
CAPSULE ORAL
COMMUNITY

## 2022-04-13 RX ORDER — CHOLECALCIFEROL TAB 50 MCG (2000 UNIT) 50 MCG
ONCE PER WEEK TAB ORAL
Status: ACTIVE | COMMUNITY

## 2022-04-13 RX ORDER — POTASSIUM CHLORIDE 1500 MG/1
TABLET, FILM COATED, EXTENDED RELEASE ORAL
Status: ACTIVE | COMMUNITY

## 2022-04-13 RX ORDER — CYANOCOBALAMIN 1000 UG/ML
INJECTION INTRAMUSCULAR; SUBCUTANEOUS
COMMUNITY

## 2022-04-13 RX ORDER — MULTIVIT-MIN/FOLIC/VIT K/LYCOP 400-300MCG
TABLET ORAL
COMMUNITY

## 2022-04-13 RX ORDER — VENLAFAXINE 37.5 MG/1
TABLET ORAL
COMMUNITY

## 2022-04-13 RX ORDER — PANTOPRAZOLE SODIUM 40 MG/1
1 TABLET TABLET, DELAYED RELEASE ORAL
COMMUNITY
Start: 2022-02-11

## 2022-04-13 RX ORDER — PREDNISONE 10 MG/1
4 TABLETS AS DIRECTED TABLET ORAL
Qty: 50 | Refills: 0 | COMMUNITY
Start: 2022-03-16

## 2022-04-13 NOTE — HPI-TODAY'S VISIT:
patient of Dr. Dickey with Crohn's, summary reviewed below (copied from prior note from Dr Dickey and I reviewed this)  has followed with Dr Light for hepatology last seen 8/2021, notes reviewed and copied below   HEPATOLOGY: 8/2021 History of the Present Illness:  Quincy Bee is a 35 y.o.  male w/ complex history of Crohn's, AIH, and Gibert's who presents today for f/up Dr. Dickey. Records from [facility] via [facility] also reviewed. Pt notes history of abnl LFTs since about time Crohn's diagnosed around age 19 w/ increased LFT elevations during times of Crohn's flares. Crohn's has historically been highly active.  Pt s/p total colectomy w/ likely terminal ilium resection, has ileostomy and rectal pouch.  Also had small bowel resections x 2 with about 13-14 in removed each time.  Has been off/on many meds (ie Remicade, Cimzia, Pentasa, Asacol, steroids) in that respect; none currently, no recent flares.  At some point pt reports being diagnosed with AIH.  Remote liver biopsy 9+ yrs ago in [place]--report n/a.  Started seeing CHEVY Gallegos w/ [facility].  Second liver biopsy done there 7/2017.  Again actual path report n/a but records review indicate that it was consistent with possible viral hepatitis and not AIH.  A 2nd review with [facility] indicated the biopsy findings were nonspecific w/ DDx including viral hepatitis, DILI, and autoimmune hepatitis.  Pt ultimately started 6-MP, prednisone for AIH given negative other work up.  Comprehensive labs from 7/2017 with MADISON neg, ASMA neg, AMA neg, viral Hep A/B/C/E neg, A1AT nl, ceruloplasmin nl, HIV neg, HSV neg, CMV and EBV neg.  Note admit here at [facility] 8/2017 due to jaundice w/ negative comprehensives and viral testing as well.  For AIH, pt has also tried azathioprine and prednisone but states LFTs never normalized.  MMF was being considered at some point but never started.    Over the years, pt with frequent admits for Crohn's, GI symptoms, abnl LFTs.  Most recently, ER at [facility] 12/29/19, diagnosed with flu and then admit 1/1-1/5/20 at [facility] for progressive symptoms.  Found to have elevated LFTs (, TBili 5.3, , ) which responded to steroids, pancreatitis w/ lipase 1218, no Crohn's flare per nl inflammatory markers. No alcohol use, no gallstones, had prior CCY, nl TG's on lipid panel.  Post D/C pt was to follow up outpatient for genetic testing (PRSS1) and also IgG4 to r/o AIP, also follow-up with Dr. Valenzuela as outpatient (possible need for EUS); none has been scheduled to date per pt.   He has not been seen since 2/2020. Multiple ED visits in the interval for abdominal pain, most recent yesterday, elevated LFTs , . Acute abdominal series done 6/16 was nonspecific, also DUS with increased echogenicity of portal triads, starry melissa appearance. He was given pred 20mg taper x 5 days.   He is here today for f/up. Not eating much other than soup. He states that things have gone downhill for the past few months. He started on Entyvio fall of last year. Follows with Dr Dickey for management of IBD.    Was doing well, but then developed N, V a few days ago until Saturday. BMs liquid/foam. Entyvio every 8 weeks. Some pruritus.   GI 3/2022 NOTES 37 y/o P with a ?history of AIH and Crohn's disease, and medical non-compliance. The patient was admitted in 6/28/19 to [facility] for possible Crohn's flare. He began a work up at [facility] prior to most recent insurance change and we do not have all of his records. His hepatologist was Jeffry at [facility]. Prior ASMA (2/2020), MADISON  and Ceruloplasmin (8/2017) and IgG (6/2021) have all been negative. He reported prior liver biopsy that confirmed AIH but he did not take steroids or Imuran; path results in EPIC chart from 2/2020 showed liver core biopsy with minimal portal chronic inflammation; bile ducts intact; minimal steatosis (less than 5%), no fibrosis appreciated; minimal iron within Kupffer cells; no lobular inflammation, hepatocecullar ballooning degeneration or necrosis.   History of SB resection in 2007 and 2010 and had a subtotal colectomy with ileostomy and rectal pouch in 2014. There was a period of time in which he was off of all IBD medications, but now on Entyvio. History of being on Remicade and Cimzia in the past. The patient has had elevated LFTs as well as Gilbert's in the past. Labs per [facility] (2017) include a negative viral hepatitis (A/B/C/E), CMV, EBV, HSV. At that time he had an unremarkable MRCP which only showed mild splenomegaly. 6/2019 US Doppler of liver and spleen showed unremarkable liver and mild splenomegaly. He was then evaluated by Dr. Light and the patient reported he questioned the dx of [facility].   EGD/Flex sigmoidoscopy 8/2019 revealed a small ulcer in the small bowel; biopsies with active inflammation.  In addition, he has been on multiple steroid tapers with He also has had multiple ER visits with admission for diffuse abdominal pain and pancreatitis (lipase and epigastric pain). EUS with Dr. Sebastien Tellez was grossly unremarkable (idiopathic pancreatitis).   He was seen in September in the office last and at that time he weighed 164 pounds. He was having some N/V and fatigue. Drug levels and antibodies for Entyvio were ordered, as well as iron studies which he did not have done. He was given 10 mg Prednisone daily for 30 days. At last visit 2/11/22 he reported that he well for about two months, and since December has been having episodic attacks of self limited, but severe N/V and left sided abdominal pain, usually lasting a few days at a time. He is feeling weak and fatigued. He has recently had more stool output than he was with the vomiting, but stool is very foul smelling; no blood. He had labs drawn 2/3 with a normal CRP of 1, normal CBC, and normal CMP aside from stable TBili of 3.1 and Alk phos of 135. Today he weighs 156 pounds. He has had no recent abdominal imaging and a CT enterography was ordered last year in March which he did not have done. He was given Cipro/Flagyl, and a Prednisone taper.  Stool studies were checked and negative for pathogens; fecal calpro and panc elastase normal. Stelara drug levels were good and no Ab. CTE showed segements of thickening in the SB upstream from the colostomy site, measuring 6 cm in length. He returns today and reports no significant change. Still having N/V, abdominal pain, and intermittent foul smelling diarrhea.

## 2022-04-17 ENCOUNTER — TELEPHONE ENCOUNTER (OUTPATIENT)
Dept: URBAN - METROPOLITAN AREA CLINIC 105 | Facility: CLINIC | Age: 37
End: 2022-04-17

## 2022-04-30 ENCOUNTER — TELEPHONE ENCOUNTER (OUTPATIENT)
Dept: URBAN - METROPOLITAN AREA CLINIC 121 | Facility: CLINIC | Age: 37
End: 2022-04-30

## 2022-05-01 ENCOUNTER — TELEPHONE ENCOUNTER (OUTPATIENT)
Dept: URBAN - METROPOLITAN AREA CLINIC 121 | Facility: CLINIC | Age: 37
End: 2022-05-01

## 2022-05-16 ENCOUNTER — OFFICE VISIT (OUTPATIENT)
Dept: URBAN - METROPOLITAN AREA CLINIC 48 | Facility: CLINIC | Age: 37
End: 2022-05-16

## 2022-05-16 RX ORDER — CHOLECALCIFEROL TAB 50 MCG (2000 UNIT) 50 MCG
ONCE PER WEEK TAB ORAL
Status: ACTIVE | COMMUNITY

## 2022-05-16 RX ORDER — POTASSIUM CHLORIDE 1500 MG/1
TABLET, FILM COATED, EXTENDED RELEASE ORAL
Status: ACTIVE | COMMUNITY

## 2022-05-16 RX ORDER — PANTOPRAZOLE SODIUM 40 MG/1
1 TABLET TABLET, DELAYED RELEASE ORAL
Status: ACTIVE | COMMUNITY
Start: 2022-02-11

## 2022-05-16 RX ORDER — SERTRALINE HYDROCHLORIDE 50 MG/1
1 TABLET TABLET, FILM COATED ORAL ONCE A DAY
Status: ACTIVE | COMMUNITY

## 2022-05-24 ENCOUNTER — OFFICE VISIT (OUTPATIENT)
Dept: URBAN - METROPOLITAN AREA CLINIC 43 | Facility: CLINIC | Age: 37
End: 2022-05-24
Payer: COMMERCIAL

## 2022-05-24 VITALS
BODY MASS INDEX: 20.01 KG/M2 | TEMPERATURE: 97.6 F | WEIGHT: 151 LBS | HEART RATE: 80 BPM | SYSTOLIC BLOOD PRESSURE: 125 MMHG | RESPIRATION RATE: 18 BRPM | DIASTOLIC BLOOD PRESSURE: 79 MMHG | HEIGHT: 73 IN

## 2022-05-24 DIAGNOSIS — K50.80 CROHN'S COLITIS: ICD-10-CM

## 2022-05-24 PROCEDURE — 96413 CHEMO IV INFUSION 1 HR: CPT | Performed by: INTERNAL MEDICINE

## 2022-05-24 RX ORDER — POTASSIUM CHLORIDE 1500 MG/1
TABLET, FILM COATED, EXTENDED RELEASE ORAL
Status: ACTIVE | COMMUNITY

## 2022-05-24 RX ORDER — MULTIVIT-MIN/FOLIC/VIT K/LYCOP 400-300MCG
TABLET ORAL
COMMUNITY

## 2022-05-24 RX ORDER — CYANOCOBALAMIN 1000 UG/ML
INJECTION INTRAMUSCULAR; SUBCUTANEOUS
COMMUNITY

## 2022-05-24 RX ORDER — SERTRALINE HYDROCHLORIDE 50 MG/1
1 TABLET TABLET, FILM COATED ORAL ONCE A DAY
Status: ACTIVE | COMMUNITY

## 2022-05-24 RX ORDER — PREDNISONE 10 MG/1
4 TABLETS AS DIRECTED TABLET ORAL
Qty: 50 | Refills: 0 | COMMUNITY
Start: 2022-03-16

## 2022-05-24 RX ORDER — LACTOSE-REDUCED FOOD
AS DIRECTED LIQUID (ML) ORAL
COMMUNITY

## 2022-05-24 RX ORDER — VENLAFAXINE 37.5 MG/1
TABLET ORAL
COMMUNITY

## 2022-05-24 RX ORDER — PANTOPRAZOLE SODIUM 40 MG/1
1 TABLET TABLET, DELAYED RELEASE ORAL
Status: ACTIVE | COMMUNITY
Start: 2022-02-11

## 2022-05-24 RX ORDER — CHOLECALCIFEROL TAB 50 MCG (2000 UNIT) 50 MCG
ONCE PER WEEK TAB ORAL
Status: ACTIVE | COMMUNITY

## 2022-05-24 RX ORDER — ERGOCALCIFEROL 1.25 MG/1
CAPSULE ORAL
COMMUNITY

## 2022-06-03 ENCOUNTER — OFFICE VISIT (OUTPATIENT)
Dept: URBAN - METROPOLITAN AREA TELEHEALTH 2 | Facility: TELEHEALTH | Age: 37
End: 2022-06-03

## 2022-06-07 ENCOUNTER — OFFICE VISIT (OUTPATIENT)
Dept: URBAN - METROPOLITAN AREA CLINIC 43 | Facility: CLINIC | Age: 37
End: 2022-06-07

## 2022-06-07 RX ORDER — LACTOSE-REDUCED FOOD
AS DIRECTED LIQUID (ML) ORAL
COMMUNITY

## 2022-06-07 RX ORDER — PANTOPRAZOLE SODIUM 40 MG/1
1 TABLET TABLET, DELAYED RELEASE ORAL
Status: ACTIVE | COMMUNITY
Start: 2022-02-11

## 2022-06-07 RX ORDER — CHOLECALCIFEROL TAB 50 MCG (2000 UNIT) 50 MCG
ONCE PER WEEK TAB ORAL
Status: ACTIVE | COMMUNITY

## 2022-06-07 RX ORDER — PREDNISONE 10 MG/1
4 TABLETS AS DIRECTED TABLET ORAL
Qty: 50 | Refills: 0 | COMMUNITY
Start: 2022-03-16

## 2022-06-07 RX ORDER — MULTIVIT-MIN/FOLIC/VIT K/LYCOP 400-300MCG
TABLET ORAL
COMMUNITY

## 2022-06-07 RX ORDER — CYANOCOBALAMIN 1000 UG/ML
INJECTION INTRAMUSCULAR; SUBCUTANEOUS
COMMUNITY

## 2022-06-07 RX ORDER — POTASSIUM CHLORIDE 1500 MG/1
TABLET, FILM COATED, EXTENDED RELEASE ORAL
Status: ACTIVE | COMMUNITY

## 2022-06-07 RX ORDER — ERGOCALCIFEROL 1.25 MG/1
CAPSULE ORAL
COMMUNITY

## 2022-06-07 RX ORDER — SERTRALINE HYDROCHLORIDE 50 MG/1
1 TABLET TABLET, FILM COATED ORAL ONCE A DAY
Status: ACTIVE | COMMUNITY

## 2022-06-07 RX ORDER — VENLAFAXINE 37.5 MG/1
TABLET ORAL
COMMUNITY

## 2022-08-16 ENCOUNTER — OFFICE VISIT (OUTPATIENT)
Dept: URBAN - METROPOLITAN AREA CLINIC 43 | Facility: CLINIC | Age: 37
End: 2022-08-16
Payer: COMMERCIAL

## 2022-08-16 VITALS
RESPIRATION RATE: 18 BRPM | WEIGHT: 153.4 LBS | TEMPERATURE: 98.4 F | SYSTOLIC BLOOD PRESSURE: 127 MMHG | DIASTOLIC BLOOD PRESSURE: 51 MMHG | HEART RATE: 86 BPM | BODY MASS INDEX: 20.33 KG/M2 | HEIGHT: 73 IN

## 2022-08-16 DIAGNOSIS — K50.80 CROHN'S COLITIS: ICD-10-CM

## 2022-08-16 PROCEDURE — 96413 CHEMO IV INFUSION 1 HR: CPT | Performed by: INTERNAL MEDICINE

## 2022-08-16 RX ORDER — ERGOCALCIFEROL 1.25 MG/1
CAPSULE ORAL
COMMUNITY

## 2022-08-16 RX ORDER — PREDNISONE 10 MG/1
4 TABLETS AS DIRECTED TABLET ORAL
Qty: 50 | Refills: 0 | COMMUNITY
Start: 2022-03-16

## 2022-08-16 RX ORDER — VENLAFAXINE 37.5 MG/1
TABLET ORAL
COMMUNITY

## 2022-08-16 RX ORDER — POTASSIUM CHLORIDE 1500 MG/1
TABLET, FILM COATED, EXTENDED RELEASE ORAL
Status: ACTIVE | COMMUNITY

## 2022-08-16 RX ORDER — PANTOPRAZOLE SODIUM 40 MG/1
1 TABLET TABLET, DELAYED RELEASE ORAL
Status: ACTIVE | COMMUNITY
Start: 2022-02-11

## 2022-08-16 RX ORDER — CYANOCOBALAMIN 1000 UG/ML
INJECTION INTRAMUSCULAR; SUBCUTANEOUS
COMMUNITY

## 2022-08-16 RX ORDER — SERTRALINE HYDROCHLORIDE 50 MG/1
1 TABLET TABLET, FILM COATED ORAL ONCE A DAY
Status: ACTIVE | COMMUNITY

## 2022-08-16 RX ORDER — MULTIVIT-MIN/FOLIC/VIT K/LYCOP 400-300MCG
TABLET ORAL
COMMUNITY

## 2022-08-16 RX ORDER — CHOLECALCIFEROL TAB 50 MCG (2000 UNIT) 50 MCG
ONCE PER WEEK TAB ORAL
Status: ACTIVE | COMMUNITY

## 2022-08-16 RX ORDER — LACTOSE-REDUCED FOOD
AS DIRECTED LIQUID (ML) ORAL
COMMUNITY

## 2022-08-18 ENCOUNTER — TELEPHONE ENCOUNTER (OUTPATIENT)
Dept: URBAN - METROPOLITAN AREA CLINIC 118 | Facility: CLINIC | Age: 37
End: 2022-08-18

## 2022-09-14 ENCOUNTER — ERX REFILL RESPONSE (OUTPATIENT)
Dept: URBAN - METROPOLITAN AREA CLINIC 44 | Facility: CLINIC | Age: 37
End: 2022-09-14

## 2022-09-14 RX ORDER — VEDOLIZUMAB 300 MG/5ML
INFUSE 300MG INTRAVENOUSLY  EVERY 8 WEEKS INJECTION, POWDER, LYOPHILIZED, FOR SOLUTION INTRAVENOUS
Qty: 1 EACH | Refills: 0 | OUTPATIENT

## 2022-09-19 ENCOUNTER — TELEPHONE ENCOUNTER (OUTPATIENT)
Dept: URBAN - METROPOLITAN AREA CLINIC 46 | Facility: CLINIC | Age: 37
End: 2022-09-19

## 2022-09-21 ENCOUNTER — OFFICE VISIT (OUTPATIENT)
Dept: URBAN - METROPOLITAN AREA TELEHEALTH 2 | Facility: TELEHEALTH | Age: 37
End: 2022-09-21

## 2022-09-21 VITALS — WEIGHT: 153 LBS | HEIGHT: 73 IN | BODY MASS INDEX: 20.28 KG/M2

## 2022-09-21 RX ORDER — LACTOSE-REDUCED FOOD
AS DIRECTED LIQUID (ML) ORAL
COMMUNITY

## 2022-09-21 RX ORDER — ERGOCALCIFEROL 1.25 MG/1
CAPSULE ORAL
COMMUNITY

## 2022-09-21 RX ORDER — CYANOCOBALAMIN 1000 UG/ML
INJECTION INTRAMUSCULAR; SUBCUTANEOUS
COMMUNITY

## 2022-09-21 RX ORDER — PANTOPRAZOLE SODIUM 40 MG/1
1 TABLET TABLET, DELAYED RELEASE ORAL
Status: ACTIVE | COMMUNITY
Start: 2022-02-11

## 2022-09-21 RX ORDER — SERTRALINE HYDROCHLORIDE 50 MG/1
1 TABLET TABLET, FILM COATED ORAL ONCE A DAY
Status: ACTIVE | COMMUNITY

## 2022-09-21 RX ORDER — CHOLECALCIFEROL TAB 50 MCG (2000 UNIT) 50 MCG
ONCE PER WEEK TAB ORAL
Status: ACTIVE | COMMUNITY

## 2022-09-21 RX ORDER — POTASSIUM CHLORIDE 1500 MG/1
TABLET, FILM COATED, EXTENDED RELEASE ORAL
Status: ACTIVE | COMMUNITY

## 2022-09-21 RX ORDER — VEDOLIZUMAB 300 MG/5ML
INFUSE 300MG INTRAVENOUSLY  EVERY 8 WEEKS INJECTION, POWDER, LYOPHILIZED, FOR SOLUTION INTRAVENOUS
Qty: 1 EACH | Refills: 0 | Status: ACTIVE | COMMUNITY

## 2022-09-21 RX ORDER — VENLAFAXINE 37.5 MG/1
TABLET ORAL
COMMUNITY

## 2022-09-21 RX ORDER — PREDNISONE 10 MG/1
4 TABLETS AS DIRECTED TABLET ORAL
Qty: 50 | Refills: 0 | COMMUNITY
Start: 2022-03-16

## 2022-09-21 RX ORDER — MULTIVIT-MIN/FOLIC/VIT K/LYCOP 400-300MCG
TABLET ORAL
COMMUNITY

## 2022-11-14 ENCOUNTER — TELEPHONE ENCOUNTER (OUTPATIENT)
Dept: URBAN - METROPOLITAN AREA CLINIC 23 | Facility: CLINIC | Age: 37
End: 2022-11-14

## 2022-11-15 ENCOUNTER — TELEPHONE ENCOUNTER (OUTPATIENT)
Dept: URBAN - METROPOLITAN AREA CLINIC 46 | Facility: CLINIC | Age: 37
End: 2022-11-15

## 2022-11-18 ENCOUNTER — CLAIMS CREATED FROM THE CLAIM WINDOW (OUTPATIENT)
Dept: URBAN - METROPOLITAN AREA CLINIC 46 | Facility: CLINIC | Age: 37
End: 2022-11-18
Payer: COMMERCIAL

## 2022-11-18 VITALS
BODY MASS INDEX: 20.38 KG/M2 | DIASTOLIC BLOOD PRESSURE: 67 MMHG | SYSTOLIC BLOOD PRESSURE: 110 MMHG | TEMPERATURE: 98.8 F | HEART RATE: 80 BPM | HEIGHT: 73 IN | WEIGHT: 153.8 LBS

## 2022-11-18 DIAGNOSIS — K75.4 AUTOIMMUNE HEPATITIS: ICD-10-CM

## 2022-11-18 DIAGNOSIS — K50.018 CROHN'S DISEASE OF SMALL INTESTINE WITH OTHER COMPLICATION: ICD-10-CM

## 2022-11-18 PROCEDURE — 99214 OFFICE O/P EST MOD 30 MIN: CPT | Performed by: INTERNAL MEDICINE

## 2022-11-18 RX ORDER — SERTRALINE HYDROCHLORIDE 50 MG/1
2 TABLET TABLET, FILM COATED ORAL ONCE A DAY
Status: ACTIVE | COMMUNITY

## 2022-11-18 RX ORDER — DIPHENOXYLATE HYDROCHLORIDE AND ATROPINE SULFATE 2.5; .025 MG/1; MG/1
TAKE 2 TABLETS BY MOUTH THREE TIMES DAILY TABLET ORAL
Refills: 0 | Status: ACTIVE | COMMUNITY

## 2022-11-18 RX ORDER — COLESTIPOL HYDROCHLORIDE 1 G/1
6 TABLETS TABLET ORAL ONCE A DAY
Qty: 180 TABLET | Refills: 2 | Status: ACTIVE | COMMUNITY

## 2022-11-18 RX ORDER — CHOLECALCIFEROL TAB 50 MCG (2000 UNIT) 50 MCG
ONCE PER WEEK TAB ORAL
Status: ACTIVE | COMMUNITY

## 2022-11-18 RX ORDER — MULTIVIT-MIN/FOLIC/VIT K/LYCOP 400-300MCG
TABLET ORAL
Status: ACTIVE | COMMUNITY

## 2022-11-18 RX ORDER — CYANOCOBALAMIN 1000 UG/ML
INJECTION INTRAMUSCULAR; SUBCUTANEOUS
Status: ACTIVE | COMMUNITY

## 2022-11-18 RX ORDER — DIPHENOXYLATE HYDROCHLORIDE AND ATROPINE SULFATE 2.5; .025 MG/1; MG/1
TAKE 2 TABLETS BY MOUTH THREE TIMES DAILY TABLET ORAL
Refills: 0
End: 2022-12-18

## 2022-11-18 RX ORDER — COLESTIPOL HYDROCHLORIDE 1 G/1
6 TABLETS TABLET ORAL ONCE A DAY
Qty: 180 TABLET | Refills: 2

## 2022-11-18 RX ORDER — POTASSIUM CHLORIDE 1500 MG/1
TABLET, FILM COATED, EXTENDED RELEASE ORAL
Status: ACTIVE | COMMUNITY

## 2022-11-18 RX ORDER — PANTOPRAZOLE SODIUM 40 MG/1
1 TABLET TABLET, DELAYED RELEASE ORAL
Status: ACTIVE | COMMUNITY
Start: 2022-02-11

## 2022-11-18 RX ORDER — VEDOLIZUMAB 300 MG/5ML
INFUSE 300MG INTRAVENOUSLY  EVERY 8 WEEKS INJECTION, POWDER, LYOPHILIZED, FOR SOLUTION INTRAVENOUS
Qty: 1 EACH | Refills: 0 | Status: ACTIVE | COMMUNITY

## 2022-11-18 NOTE — HPI-TODAY'S VISIT:
The patient is a 38 yo M with Crohn's disease, autoimmune hepatitis, Gilbert's who presents for evaluation of Crohn's disease. He is taking Entyvio and appears to be in remission.  He has a complex history of chronic Crohn's disease x 18 years and is s/p total colectomy with terminal ileum resection with ileostomy and rectal pouch. He also has had 2 small bowel rections with 14 inches removed each time.  He has been on several medications(Cimzia, Remicade, mesalamine products and steroids). History of non-compliance and has been loss to follow up since we have been treating him.   He has been followed by hepatology for ?AIH. There is some question regarding the validity of this diagnosis.    Today, he presents with his wife and mentions that he feels "pretty good". He states that the infusions are helping . He does mention that he continues to have watery diarrhea up to 10 per day. Denies hematochezia. He is concerned that his weight is under his baseline of 165 lbs. He is 150's today. His appetite remains good. Deven Guillaume MD

## 2022-11-28 ENCOUNTER — CLAIMS CREATED FROM THE CLAIM WINDOW (OUTPATIENT)
Dept: URBAN - METROPOLITAN AREA MEDICAL CENTER 35 | Facility: MEDICAL CENTER | Age: 37
End: 2022-11-28
Payer: COMMERCIAL

## 2022-11-28 ENCOUNTER — TELEPHONE ENCOUNTER (OUTPATIENT)
Dept: URBAN - METROPOLITAN AREA CLINIC 46 | Facility: CLINIC | Age: 37
End: 2022-11-28

## 2022-11-28 DIAGNOSIS — K50.90 CROHNS DISEASE: ICD-10-CM

## 2022-11-28 DIAGNOSIS — E87.8 ELECTROLYTE ABNORMALITY: ICD-10-CM

## 2022-11-28 DIAGNOSIS — R79.89 ABNORMAL BILIRUBIN TEST: ICD-10-CM

## 2022-11-28 DIAGNOSIS — R74.01 ABNORMAL/ELEVATED TRANSAMINASE (SGOT, AMINOTRANSFERASE): ICD-10-CM

## 2022-11-28 PROCEDURE — 99222 1ST HOSP IP/OBS MODERATE 55: CPT | Performed by: INTERNAL MEDICINE

## 2022-11-28 RX ORDER — VEDOLIZUMAB 300 MG/5ML
AS DIRECTED INJECTION, POWDER, LYOPHILIZED, FOR SOLUTION INTRAVENOUS
Qty: 300 | OUTPATIENT
Start: 2022-11-29 | End: 2023-02-27

## 2022-11-29 ENCOUNTER — CLAIMS CREATED FROM THE CLAIM WINDOW (OUTPATIENT)
Dept: URBAN - METROPOLITAN AREA MEDICAL CENTER 35 | Facility: MEDICAL CENTER | Age: 37
End: 2022-11-29
Payer: COMMERCIAL

## 2022-11-29 ENCOUNTER — TELEPHONE ENCOUNTER (OUTPATIENT)
Dept: URBAN - METROPOLITAN AREA CLINIC 44 | Facility: CLINIC | Age: 37
End: 2022-11-29

## 2022-11-29 DIAGNOSIS — K50.90 CROHNS DISEASE: ICD-10-CM

## 2022-11-29 DIAGNOSIS — E87.8 ELECTROLYTE ABNORMALITY: ICD-10-CM

## 2022-11-29 DIAGNOSIS — R74.01 ABNORMAL/ELEVATED TRANSAMINASE (SGOT, AMINOTRANSFERASE): ICD-10-CM

## 2022-11-29 DIAGNOSIS — R79.89 ABNORMAL BILIRUBIN TEST: ICD-10-CM

## 2022-11-29 PROBLEM — 56689002: Status: ACTIVE | Noted: 2021-09-27

## 2022-11-29 PROCEDURE — 99232 SBSQ HOSP IP/OBS MODERATE 35: CPT | Performed by: INTERNAL MEDICINE

## 2022-11-29 RX ORDER — CHOLESTYRAMINE 4 G/9G
1 PACKET MIXED WITH WATER OR NON-CARBONATED DRINK POWDER, FOR SUSPENSION ORAL
Qty: 120 | Refills: 2 | OUTPATIENT
Start: 2022-11-29

## 2022-12-01 ENCOUNTER — OUT OF OFFICE VISIT (OUTPATIENT)
Dept: URBAN - METROPOLITAN AREA MEDICAL CENTER 35 | Facility: MEDICAL CENTER | Age: 37
End: 2022-12-01
Payer: COMMERCIAL

## 2022-12-01 DIAGNOSIS — E86.0 DEHYDRATION: ICD-10-CM

## 2022-12-01 DIAGNOSIS — R79.89 ABNORMAL BILIRUBIN TEST: ICD-10-CM

## 2022-12-01 DIAGNOSIS — R74.01 ABNORMAL/ELEVATED TRANSAMINASE (SGOT, AMINOTRANSFERASE): ICD-10-CM

## 2022-12-01 DIAGNOSIS — K50.90 CROHNS DISEASE: ICD-10-CM

## 2022-12-01 PROCEDURE — 99232 SBSQ HOSP IP/OBS MODERATE 35: CPT | Performed by: INTERNAL MEDICINE

## 2022-12-02 ENCOUNTER — OUT OF OFFICE VISIT (OUTPATIENT)
Dept: URBAN - METROPOLITAN AREA MEDICAL CENTER 35 | Facility: MEDICAL CENTER | Age: 37
End: 2022-12-02
Payer: COMMERCIAL

## 2022-12-02 DIAGNOSIS — R79.89 ABNORMAL BILIRUBIN TEST: ICD-10-CM

## 2022-12-02 DIAGNOSIS — E86.0 DEHYDRATION: ICD-10-CM

## 2022-12-02 DIAGNOSIS — K50.90 CROHNS DISEASE: ICD-10-CM

## 2022-12-02 DIAGNOSIS — R74.01 ABNORMAL/ELEVATED TRANSAMINASE (SGOT, AMINOTRANSFERASE): ICD-10-CM

## 2022-12-02 PROCEDURE — 99232 SBSQ HOSP IP/OBS MODERATE 35: CPT | Performed by: INTERNAL MEDICINE

## 2022-12-03 ENCOUNTER — OUT OF OFFICE VISIT (OUTPATIENT)
Dept: URBAN - METROPOLITAN AREA MEDICAL CENTER 35 | Facility: MEDICAL CENTER | Age: 37
End: 2022-12-03
Payer: COMMERCIAL

## 2022-12-03 DIAGNOSIS — R19.7 ACUTE DIARRHEA: ICD-10-CM

## 2022-12-03 DIAGNOSIS — K50.90 ABDOMINAL PAIN DESPITE THERAPY FOR CROHN'S DISEASE: ICD-10-CM

## 2022-12-03 DIAGNOSIS — K75.4 AUTOIMMUNE HEPATITIS: ICD-10-CM

## 2022-12-03 PROCEDURE — 99233 SBSQ HOSP IP/OBS HIGH 50: CPT | Performed by: INTERNAL MEDICINE

## 2022-12-07 ENCOUNTER — TELEPHONE ENCOUNTER (OUTPATIENT)
Dept: URBAN - METROPOLITAN AREA CLINIC 46 | Facility: CLINIC | Age: 37
End: 2022-12-07

## 2022-12-13 ENCOUNTER — OFFICE VISIT (OUTPATIENT)
Dept: URBAN - METROPOLITAN AREA CLINIC 43 | Facility: CLINIC | Age: 37
End: 2022-12-13
Payer: COMMERCIAL

## 2022-12-13 ENCOUNTER — TELEPHONE ENCOUNTER (OUTPATIENT)
Dept: URBAN - METROPOLITAN AREA CLINIC 46 | Facility: CLINIC | Age: 37
End: 2022-12-13

## 2022-12-13 VITALS
WEIGHT: 154.4 LBS | SYSTOLIC BLOOD PRESSURE: 107 MMHG | HEIGHT: 73 IN | BODY MASS INDEX: 20.46 KG/M2 | TEMPERATURE: 97.7 F | DIASTOLIC BLOOD PRESSURE: 73 MMHG | RESPIRATION RATE: 18 BRPM | HEART RATE: 89 BPM

## 2022-12-13 DIAGNOSIS — K50.80 CROHN'S COLITIS: ICD-10-CM

## 2022-12-13 PROCEDURE — 96413 CHEMO IV INFUSION 1 HR: CPT | Performed by: INTERNAL MEDICINE

## 2022-12-13 RX ORDER — CYANOCOBALAMIN 1000 UG/ML
INJECTION INTRAMUSCULAR; SUBCUTANEOUS
Status: ACTIVE | COMMUNITY

## 2022-12-13 RX ORDER — SERTRALINE HYDROCHLORIDE 50 MG/1
2 TABLET TABLET, FILM COATED ORAL ONCE A DAY
Status: ACTIVE | COMMUNITY

## 2022-12-13 RX ORDER — CHOLESTYRAMINE 4 G/9G
1 PACKET MIXED WITH WATER OR NON-CARBONATED DRINK POWDER, FOR SUSPENSION ORAL
Qty: 120 | Refills: 2 | Status: ACTIVE | COMMUNITY
Start: 2022-11-29

## 2022-12-13 RX ORDER — DIPHENOXYLATE HYDROCHLORIDE AND ATROPINE SULFATE 2.5; .025 MG/1; MG/1
TAKE 2 TABLETS BY MOUTH THREE TIMES DAILY TABLET ORAL
Refills: 0 | Status: ACTIVE | COMMUNITY
End: 2022-12-18

## 2022-12-13 RX ORDER — VEDOLIZUMAB 300 MG/5ML
AS DIRECTED INJECTION, POWDER, LYOPHILIZED, FOR SOLUTION INTRAVENOUS
Qty: 300 | Status: ACTIVE | COMMUNITY
Start: 2022-11-29 | End: 2023-02-27

## 2022-12-13 RX ORDER — MULTIVIT-MIN/FOLIC/VIT K/LYCOP 400-300MCG
TABLET ORAL
Status: ACTIVE | COMMUNITY

## 2022-12-13 RX ORDER — POTASSIUM CHLORIDE 1500 MG/1
TABLET, FILM COATED, EXTENDED RELEASE ORAL
Status: ACTIVE | COMMUNITY

## 2022-12-13 RX ORDER — PANTOPRAZOLE SODIUM 40 MG/1
1 TABLET TABLET, DELAYED RELEASE ORAL
Status: ACTIVE | COMMUNITY
Start: 2022-02-11

## 2022-12-13 RX ORDER — VEDOLIZUMAB 300 MG/5ML
INFUSE 300MG INTRAVENOUSLY  EVERY 8 WEEKS INJECTION, POWDER, LYOPHILIZED, FOR SOLUTION INTRAVENOUS
Qty: 1 EACH | Refills: 0 | Status: ACTIVE | COMMUNITY

## 2022-12-13 RX ORDER — CHOLECALCIFEROL TAB 50 MCG (2000 UNIT) 50 MCG
ONCE PER WEEK TAB ORAL
Status: ACTIVE | COMMUNITY

## 2022-12-13 RX ORDER — COLESTIPOL HYDROCHLORIDE 1 G/1
6 TABLETS TABLET ORAL ONCE A DAY
Qty: 180 TABLET | Refills: 2 | Status: ACTIVE | COMMUNITY

## 2022-12-14 ENCOUNTER — TELEPHONE ENCOUNTER (OUTPATIENT)
Dept: URBAN - METROPOLITAN AREA CLINIC 46 | Facility: CLINIC | Age: 37
End: 2022-12-14

## 2022-12-14 RX ORDER — RIFAXIMIN 550 MG/1
1 TABLET TABLET ORAL THREE TIMES A DAY
Qty: 30 TABLET | Refills: 0 | OUTPATIENT
Start: 2022-12-15 | End: 2022-12-25

## 2022-12-15 ENCOUNTER — OFFICE VISIT (OUTPATIENT)
Dept: URBAN - METROPOLITAN AREA CLINIC 44 | Facility: CLINIC | Age: 37
End: 2022-12-15
Payer: COMMERCIAL

## 2022-12-15 VITALS
DIASTOLIC BLOOD PRESSURE: 68 MMHG | BODY MASS INDEX: 21.1 KG/M2 | SYSTOLIC BLOOD PRESSURE: 108 MMHG | HEIGHT: 73 IN | WEIGHT: 159.2 LBS | HEART RATE: 84 BPM | TEMPERATURE: 97 F

## 2022-12-15 DIAGNOSIS — K90.9 INTESTINAL MALABSORPTION, UNSPECIFIED: ICD-10-CM

## 2022-12-15 DIAGNOSIS — R11.10 VOMITING, UNSPECIFIED: ICD-10-CM

## 2022-12-15 DIAGNOSIS — E44.0 MODERATE PROTEIN-CALORIE MALNUTRITION: ICD-10-CM

## 2022-12-15 DIAGNOSIS — R19.7 DIARRHEA, UNSPECIFIED: ICD-10-CM

## 2022-12-15 DIAGNOSIS — R63.4 WEIGHT LOSS: ICD-10-CM

## 2022-12-15 DIAGNOSIS — K50.90 CROHNS DISEASE: ICD-10-CM

## 2022-12-15 DIAGNOSIS — R11.14 BILIOUS VOMITING WITH NAUSEA: ICD-10-CM

## 2022-12-15 PROCEDURE — 99214 OFFICE O/P EST MOD 30 MIN: CPT | Performed by: INTERNAL MEDICINE

## 2022-12-15 RX ORDER — PANTOPRAZOLE SODIUM 40 MG/1
1 TABLET TABLET, DELAYED RELEASE ORAL
Status: ACTIVE | COMMUNITY
Start: 2022-02-11

## 2022-12-15 RX ORDER — POTASSIUM CHLORIDE 1500 MG/1
1 TABLET WITH FOOD TABLET, EXTENDED RELEASE ORAL ONCE A DAY
Qty: 90 TABLET | Status: ACTIVE | COMMUNITY

## 2022-12-15 RX ORDER — CHOLECALCIFEROL TAB 50 MCG (2000 UNIT) 50 MCG
ONCE PER WEEK TAB ORAL
Status: ACTIVE | COMMUNITY

## 2022-12-15 RX ORDER — COLESTIPOL HYDROCHLORIDE 1 G/1
6 TABLETS TABLET ORAL ONCE A DAY
Qty: 180 TABLET | Refills: 2 | Status: ACTIVE | COMMUNITY

## 2022-12-15 RX ORDER — MULTIVIT-MIN/FOLIC/VIT K/LYCOP 400-300MCG
1 TABLET TABLET ORAL ONCE A DAY
Status: ACTIVE | COMMUNITY

## 2022-12-15 RX ORDER — VEDOLIZUMAB 300 MG/5ML
AS DIRECTED INJECTION, POWDER, LYOPHILIZED, FOR SOLUTION INTRAVENOUS
Qty: 300 | Status: ACTIVE | COMMUNITY
Start: 2022-11-29 | End: 2023-02-27

## 2022-12-15 RX ORDER — CHOLESTYRAMINE 4 G/9G
1 PACKET MIXED WITH WATER OR NON-CARBONATED DRINK POWDER, FOR SUSPENSION ORAL
Qty: 120 | Refills: 2 | Status: ACTIVE | COMMUNITY
Start: 2022-11-29

## 2022-12-15 RX ORDER — VENLAFAXINE 75 MG/1
TAKE 1 TABLET BY MOUTH TWICE DAILY WITH FOOD TABLET ORAL ONCE A DAY
Refills: 0 | Status: ACTIVE | COMMUNITY

## 2022-12-15 RX ORDER — POTASSIUM CHLORIDE 1500 MG/1
1 TABLET WITH FOOD TABLET, FILM COATED, EXTENDED RELEASE ORAL ONCE A DAY
Status: ACTIVE | COMMUNITY

## 2022-12-15 RX ORDER — CYANOCOBALAMIN 1000 UG/ML
1 ML INJECTION INTRAMUSCULAR; SUBCUTANEOUS ONCE A DAY
Status: ACTIVE | COMMUNITY

## 2022-12-15 RX ORDER — RIFAXIMIN 550 MG/1
1 TABLET TABLET ORAL THREE TIMES A DAY
Qty: 42 TABLET | Refills: 1 | OUTPATIENT
Start: 2022-12-16 | End: 2023-01-13

## 2022-12-15 RX ORDER — DIPHENOXYLATE HYDROCHLORIDE AND ATROPINE SULFATE 2.5; .025 MG/1; MG/1
TAKE 2 TABLETS BY MOUTH THREE TIMES DAILY TABLET ORAL
Refills: 0 | Status: ACTIVE | COMMUNITY
End: 2022-12-18

## 2022-12-15 RX ORDER — SERTRALINE HYDROCHLORIDE 50 MG/1
1 TABLET TABLET, FILM COATED ORAL ONCE A DAY
Status: ACTIVE | COMMUNITY

## 2022-12-15 NOTE — HPI-TODAY'S VISIT:
The patient is a 36 yo M with Crohn's disease, autoimmune hepatitis, Gilbert's who presents for evaluation of Crohn's disease. He is taking Entyvio and appears to be in remission.  He has a complex history of chronic Crohn's disease x 18 years and is s/p total colectomy with terminal ileum resection with ileostomy and rectal pouch. He also has had 2 small bowel rections with 14 inches removed each time.  He has been on several medications(Cimzia, Remicade, mesalamine products and steroids). History of non-compliance and has been loss to follow up since we have been treating him.   He has been followed by hepatology for ?AIH. There is some question regarding the validity of this diagnosis.    On the last visit 2 weeks ago, he presented with this wife and mentions that he felt "pretty good". He stated that the infusions are helping . He did mention that he continued to have watery diarrhea up to 10 per day and his weight was hovering 150s(baseline 165). We gracie labs and noticed that he had significant hypokalemia. A few days prior to the labs he mentions that he had significant fatigue and presented to the ER on 12/2. Labs at that time were consistent with hypokalemia and volume depletion.  We initiated IVFs and TPN. In addition, we implemented Prevalite bid and he mentions that this seemed to help. He was discharged with TPN and returns today for post discharge evaluation.

## 2022-12-16 PROBLEM — 62315008: Status: ACTIVE | Noted: 2022-12-16

## 2022-12-19 ENCOUNTER — OFFICE VISIT (OUTPATIENT)
Dept: URBAN - METROPOLITAN AREA CLINIC 48 | Facility: CLINIC | Age: 37
End: 2022-12-19

## 2022-12-20 ENCOUNTER — P2P PATIENT RECORD (OUTPATIENT)
Age: 37
End: 2022-12-20

## 2022-12-21 ENCOUNTER — TELEPHONE ENCOUNTER (OUTPATIENT)
Dept: URBAN - METROPOLITAN AREA CLINIC 46 | Facility: CLINIC | Age: 37
End: 2022-12-21

## 2023-01-11 ENCOUNTER — TELEPHONE ENCOUNTER (OUTPATIENT)
Dept: URBAN - METROPOLITAN AREA CLINIC 44 | Facility: CLINIC | Age: 38
End: 2023-01-11

## 2023-01-17 ENCOUNTER — OFFICE VISIT (OUTPATIENT)
Dept: URBAN - METROPOLITAN AREA CLINIC 44 | Facility: CLINIC | Age: 38
End: 2023-01-17

## 2023-01-23 ENCOUNTER — OFFICE VISIT (OUTPATIENT)
Dept: URBAN - METROPOLITAN AREA CLINIC 48 | Facility: CLINIC | Age: 38
End: 2023-01-23
Payer: COMMERCIAL

## 2023-01-23 VITALS
HEART RATE: 81 BPM | BODY MASS INDEX: 27.35 KG/M2 | WEIGHT: 206.4 LBS | DIASTOLIC BLOOD PRESSURE: 71 MMHG | OXYGEN SATURATION: 99 % | SYSTOLIC BLOOD PRESSURE: 105 MMHG | TEMPERATURE: 98.1 F | HEIGHT: 73 IN

## 2023-01-23 DIAGNOSIS — K50.90 CROHNS DISEASE: ICD-10-CM

## 2023-01-23 DIAGNOSIS — R19.7 DIARRHEA, UNSPECIFIED: ICD-10-CM

## 2023-01-23 DIAGNOSIS — E44.0 MODERATE PROTEIN-CALORIE MALNUTRITION: ICD-10-CM

## 2023-01-23 DIAGNOSIS — R63.4 WEIGHT LOSS: ICD-10-CM

## 2023-01-23 PROCEDURE — 99214 OFFICE O/P EST MOD 30 MIN: CPT | Performed by: INTERNAL MEDICINE

## 2023-01-23 RX ORDER — PANTOPRAZOLE SODIUM 40 MG/1
1 TABLET TABLET, DELAYED RELEASE ORAL
Status: ACTIVE | COMMUNITY
Start: 2022-02-11

## 2023-01-23 RX ORDER — VEDOLIZUMAB 300 MG/5ML
AS DIRECTED INJECTION, POWDER, LYOPHILIZED, FOR SOLUTION INTRAVENOUS
Qty: 300 | Status: ACTIVE | COMMUNITY
Start: 2022-11-29 | End: 2023-02-27

## 2023-01-23 RX ORDER — MULTIVIT-MIN/FOLIC/VIT K/LYCOP 400-300MCG
1 TABLET TABLET ORAL ONCE A DAY
Status: ACTIVE | COMMUNITY

## 2023-01-23 RX ORDER — CIPROFLOXACIN HYDROCHLORIDE 250 MG/1
1 TABLET TABLET, FILM COATED ORAL
Qty: 6 | OUTPATIENT
Start: 2023-01-23 | End: 2023-01-26

## 2023-01-23 RX ORDER — POTASSIUM CHLORIDE 1500 MG/1
1 TABLET WITH FOOD TABLET, FILM COATED, EXTENDED RELEASE ORAL ONCE A DAY
Status: ACTIVE | COMMUNITY

## 2023-01-23 RX ORDER — SERTRALINE HYDROCHLORIDE 50 MG/1
1 TABLET TABLET, FILM COATED ORAL ONCE A DAY
Status: ACTIVE | COMMUNITY

## 2023-01-23 RX ORDER — PREDNISONE 10 MG/1
1 TABLET TABLET ORAL ONCE A DAY
Qty: 30 | OUTPATIENT
Start: 2023-01-23 | End: 2023-02-22

## 2023-01-23 RX ORDER — CHOLESTYRAMINE 4 G/9G
1 PACKET MIXED WITH WATER OR NON-CARBONATED DRINK POWDER, FOR SUSPENSION ORAL
Qty: 120 | Refills: 2 | Status: ACTIVE | COMMUNITY
Start: 2022-11-29

## 2023-01-23 RX ORDER — CHOLECALCIFEROL TAB 50 MCG (2000 UNIT) 50 MCG
ONCE PER WEEK TAB ORAL
Status: ACTIVE | COMMUNITY

## 2023-01-23 RX ORDER — COLESTIPOL HYDROCHLORIDE 1 G/1
6 TABLETS TABLET ORAL ONCE A DAY
Qty: 180 TABLET | Refills: 2 | Status: ACTIVE | COMMUNITY

## 2023-01-23 RX ORDER — POTASSIUM CHLORIDE 1500 MG/1
1 TABLET WITH FOOD TABLET, EXTENDED RELEASE ORAL ONCE A DAY
Qty: 90 TABLET | Status: ACTIVE | COMMUNITY

## 2023-01-23 RX ORDER — CYANOCOBALAMIN 1000 UG/ML
1 ML INJECTION INTRAMUSCULAR; SUBCUTANEOUS ONCE A DAY
Status: ACTIVE | COMMUNITY

## 2023-01-23 RX ORDER — VENLAFAXINE 75 MG/1
TAKE 1 TABLET BY MOUTH TWICE DAILY WITH FOOD TABLET ORAL ONCE A DAY
Refills: 0 | Status: ACTIVE | COMMUNITY

## 2023-01-23 NOTE — HPI-TODAY'S VISIT:
The patient is a 38 yo M with Crohn's disease, autoimmune hepatitis, Gilbert's who presents for evaluation of Crohn's disease. He is taking Entyvio and appears to be in remission.  He has a complex history of chronic Crohn's disease x 18 years and is s/p total colectomy with terminal ileum resection with ileostomy and rectal pouch. He also has had 2 small bowel rections with 14 inches removed each time.  He has been on several medications(Cimzia, Remicade, mesalamine products and steroids). History of non-compliance and has been loss to follow up since we have been treating him.   He has been followed by hepatology for ?AIH. There is some question regarding the validity of this diagnosis.    On the last visit  a month ago, he presented with this wife and mentions that he felt "pretty good". He stated that the infusions are helping . He did mention that he continued to have watery diarrhea up to 10 per day and his weight was hovering 150s(baseline 165). We gracie labs and noticed that he had significant hypokalemia. A few days prior to the labs he mentions that he had significant fatigue and presented to the ER on 12/2. Labs at that time were consistent with hypokalemia and volume depletion.  We initiated IVFs and TPN. In addition, we implemented Prevalite bid and he mentions that this seemed to help. He was discharged with TPN and returns today for post discharge evaluation.  Also on the last visit we implemented Xifaxan x 14 days. He mentions that he still does not have formed stools.  Today, he weighs 170 lbs.

## 2023-01-30 ENCOUNTER — TELEPHONE ENCOUNTER (OUTPATIENT)
Dept: URBAN - METROPOLITAN AREA CLINIC 46 | Facility: CLINIC | Age: 38
End: 2023-01-30

## 2023-01-30 PROBLEM — 408335007 AUTOIMMUNE HEPATITIS: Status: ACTIVE | Noted: 2019-08-06

## 2023-01-30 PROBLEM — 413603009: Status: ACTIVE | Noted: 2021-09-27

## 2023-01-30 RX ORDER — DIPHENOXYLATE HYDROCHLORIDE AND ATROPINE SULFATE 2.5; .025 MG/1; MG/1
TAKE 2 TABLETS BY MOUTH THREE TIMES DAILY TABLET ORAL
Qty: 120 TABLET | Refills: 0

## 2023-01-31 ENCOUNTER — TELEPHONE ENCOUNTER (OUTPATIENT)
Dept: URBAN - METROPOLITAN AREA CLINIC 48 | Facility: CLINIC | Age: 38
End: 2023-01-31

## 2023-02-01 ENCOUNTER — TELEPHONE ENCOUNTER (OUTPATIENT)
Dept: URBAN - METROPOLITAN AREA CLINIC 46 | Facility: CLINIC | Age: 38
End: 2023-02-01

## 2023-02-01 RX ORDER — DIPHENOXYLATE HYDROCHLORIDE AND ATROPINE SULFATE 2.5; .025 MG/1; MG/1
TAKE 2 TABLETS BY MOUTH THREE TIMES DAILY TABLET ORAL
Qty: 120 TABLET | Refills: 0
End: 2023-03-03

## 2023-02-02 ENCOUNTER — WEB ENCOUNTER (OUTPATIENT)
Dept: URBAN - METROPOLITAN AREA CLINIC 44 | Facility: CLINIC | Age: 38
End: 2023-02-02

## 2023-02-02 ENCOUNTER — WEB ENCOUNTER (OUTPATIENT)
Dept: URBAN - METROPOLITAN AREA CLINIC 48 | Facility: CLINIC | Age: 38
End: 2023-02-02

## 2023-02-03 PROBLEM — 190606006: Status: ACTIVE | Noted: 2022-12-16

## 2023-02-16 ENCOUNTER — TELEPHONE ENCOUNTER (OUTPATIENT)
Dept: URBAN - METROPOLITAN AREA CLINIC 46 | Facility: CLINIC | Age: 38
End: 2023-02-16

## 2023-02-17 ENCOUNTER — WEB ENCOUNTER (OUTPATIENT)
Dept: URBAN - METROPOLITAN AREA CLINIC 44 | Facility: CLINIC | Age: 38
End: 2023-02-17

## 2023-02-21 ENCOUNTER — WEB ENCOUNTER (OUTPATIENT)
Dept: URBAN - METROPOLITAN AREA CLINIC 44 | Facility: CLINIC | Age: 38
End: 2023-02-21

## 2023-02-23 ENCOUNTER — WEB ENCOUNTER (OUTPATIENT)
Dept: URBAN - METROPOLITAN AREA CLINIC 44 | Facility: CLINIC | Age: 38
End: 2023-02-23

## 2023-02-25 ENCOUNTER — WEB ENCOUNTER (OUTPATIENT)
Dept: URBAN - METROPOLITAN AREA CLINIC 44 | Facility: CLINIC | Age: 38
End: 2023-02-25

## 2023-03-01 ENCOUNTER — OFFICE VISIT (OUTPATIENT)
Dept: URBAN - METROPOLITAN AREA CLINIC 117 | Facility: CLINIC | Age: 38
End: 2023-03-01

## 2023-03-06 ENCOUNTER — OFFICE VISIT (OUTPATIENT)
Dept: URBAN - METROPOLITAN AREA CLINIC 48 | Facility: CLINIC | Age: 38
End: 2023-03-06
Payer: COMMERCIAL

## 2023-03-06 ENCOUNTER — WEB ENCOUNTER (OUTPATIENT)
Dept: URBAN - METROPOLITAN AREA CLINIC 44 | Facility: CLINIC | Age: 38
End: 2023-03-06

## 2023-03-06 VITALS
DIASTOLIC BLOOD PRESSURE: 70 MMHG | BODY MASS INDEX: 21.47 KG/M2 | TEMPERATURE: 97.9 F | SYSTOLIC BLOOD PRESSURE: 102 MMHG | HEART RATE: 82 BPM | HEIGHT: 73 IN | WEIGHT: 162 LBS

## 2023-03-06 DIAGNOSIS — K50.018 CROHN'S DISEASE OF SMALL INTESTINE WITH OTHER COMPLICATION: ICD-10-CM

## 2023-03-06 PROBLEM — 34000006 CROHNS DISEASE: Status: ACTIVE | Noted: 2022-12-16

## 2023-03-06 PROCEDURE — 99214 OFFICE O/P EST MOD 30 MIN: CPT | Performed by: INTERNAL MEDICINE

## 2023-03-06 RX ORDER — CYANOCOBALAMIN 1000 UG/ML
1 ML INJECTION INTRAMUSCULAR; SUBCUTANEOUS ONCE A DAY
Status: ACTIVE | COMMUNITY

## 2023-03-06 RX ORDER — VENLAFAXINE 75 MG/1
TAKE 1 TABLET BY MOUTH TWICE DAILY WITH FOOD TABLET ORAL ONCE A DAY
Refills: 0 | Status: ACTIVE | COMMUNITY

## 2023-03-06 RX ORDER — RISANKIZUMAB-RZAA 360 MG/2.4
AS DIRECTED WEARABLE INJECTOR SUBCUTANEOUS
Qty: 3 | Refills: 1 | OUTPATIENT

## 2023-03-06 RX ORDER — POTASSIUM CHLORIDE 1500 MG/1
1 TABLET WITH FOOD TABLET, EXTENDED RELEASE ORAL ONCE A DAY
Qty: 90 TABLET | Status: ACTIVE | COMMUNITY

## 2023-03-06 RX ORDER — CHOLESTYRAMINE 4 G/9G
1 PACKET MIXED WITH WATER OR NON-CARBONATED DRINK POWDER, FOR SUSPENSION ORAL
Qty: 120 | Refills: 2 | Status: ACTIVE | COMMUNITY
Start: 2022-11-29

## 2023-03-06 RX ORDER — DIPHENOXYLATE HYDROCHLORIDE AND ATROPINE SULFATE 2.5; .025 MG/1; MG/1
1 TABLET AS NEEDED TABLET ORAL
Status: ACTIVE | COMMUNITY

## 2023-03-06 RX ORDER — CHOLECALCIFEROL TAB 50 MCG (2000 UNIT) 50 MCG
ONCE PER WEEK TAB ORAL
Status: ACTIVE | COMMUNITY

## 2023-03-06 RX ORDER — MULTIVIT-MIN/FOLIC/VIT K/LYCOP 400-300MCG
1 TABLET TABLET ORAL ONCE A DAY
Status: ACTIVE | COMMUNITY

## 2023-03-06 RX ORDER — RISANKIZUMAB-RZAA 60 MG/ML
AS DIRECTED INJECTION INTRAVENOUS
Qty: 3 | Refills: 0 | OUTPATIENT

## 2023-03-06 NOTE — HPI-TODAY'S VISIT:
36 yo M with Crohn's disease, autoimmune hepatitis, Gilbert's who returns for evaluation of Crohn's disease with weight loss and watery stools. He is taking Entyvio and appears to be in remission.  He has a complex history of chronic Crohn's disease x 18 years and is s/p total colectomy with terminal ileum resection with ileostomy and rectal pouch. He also has had 2 small bowel resections with 14 inches (ca. 36 cm) removed each time.  He has been on several medications(Cimzia, Remicade, mesalamine products and steroids). History of non-compliance and has been loss to follow up since we have been treating him.   He has been followed by hepatology for ?AIH. There is some question regarding the validity of this diagnosis.    Over the last 2 months he has been c/o watery diarrhea up to 10 per day and has lost weight. Last month he went on a cruise and had to visit an urgent care center due to severe dehydration. Prior to this he was admitted to the hospital on 12/22 for dehydration and subsequent hypokalemia. We  started him on  TPN for a few weeks. He did remarkably well and gained a significant amount of solid weight.  Weight was hovering 150s(baseline 165).  In addition, we implemented Prevalite bid, and he mentions that this seemed to help. He was discharged with TPN and returns today for post discharge evaluation.  Also, on the last visit we implemented Xifaxan x 14 days. He mentions that he still does not have formed stools.  Today, he weighs 170 lbs.

## 2023-03-07 ENCOUNTER — WEB ENCOUNTER (OUTPATIENT)
Dept: URBAN - METROPOLITAN AREA CLINIC 44 | Facility: CLINIC | Age: 38
End: 2023-03-07

## 2023-03-08 ENCOUNTER — WEB ENCOUNTER (OUTPATIENT)
Dept: URBAN - METROPOLITAN AREA CLINIC 44 | Facility: CLINIC | Age: 38
End: 2023-03-08

## 2023-03-10 ENCOUNTER — WEB ENCOUNTER (OUTPATIENT)
Dept: URBAN - METROPOLITAN AREA CLINIC 44 | Facility: CLINIC | Age: 38
End: 2023-03-10

## 2023-03-13 ENCOUNTER — TELEPHONE ENCOUNTER (OUTPATIENT)
Dept: URBAN - METROPOLITAN AREA CLINIC 48 | Facility: CLINIC | Age: 38
End: 2023-03-13

## 2023-03-14 ENCOUNTER — OFFICE VISIT (OUTPATIENT)
Dept: URBAN - METROPOLITAN AREA CLINIC 43 | Facility: CLINIC | Age: 38
End: 2023-03-14
Payer: COMMERCIAL

## 2023-03-14 VITALS
RESPIRATION RATE: 18 BRPM | WEIGHT: 163.6 LBS | BODY MASS INDEX: 21.68 KG/M2 | HEART RATE: 86 BPM | SYSTOLIC BLOOD PRESSURE: 106 MMHG | HEIGHT: 73 IN | TEMPERATURE: 97.9 F | DIASTOLIC BLOOD PRESSURE: 65 MMHG

## 2023-03-14 DIAGNOSIS — K50.80 CROHN'S COLITIS: ICD-10-CM

## 2023-03-14 PROCEDURE — 96413 CHEMO IV INFUSION 1 HR: CPT | Performed by: INTERNAL MEDICINE

## 2023-03-14 RX ORDER — MULTIVIT-MIN/FOLIC/VIT K/LYCOP 400-300MCG
1 TABLET TABLET ORAL ONCE A DAY
Status: ACTIVE | COMMUNITY

## 2023-03-14 RX ORDER — VENLAFAXINE 75 MG/1
TAKE 1 TABLET BY MOUTH TWICE DAILY WITH FOOD TABLET ORAL ONCE A DAY
Refills: 0 | Status: ACTIVE | COMMUNITY

## 2023-03-14 RX ORDER — CYANOCOBALAMIN 1000 UG/ML
1 ML INJECTION INTRAMUSCULAR; SUBCUTANEOUS ONCE A DAY
Status: ACTIVE | COMMUNITY

## 2023-03-14 RX ORDER — RISANKIZUMAB-RZAA 360 MG/2.4
AS DIRECTED WEARABLE INJECTOR SUBCUTANEOUS
Qty: 3 | Refills: 1 | Status: ACTIVE | COMMUNITY

## 2023-03-14 RX ORDER — POTASSIUM CHLORIDE 1500 MG/1
1 TABLET WITH FOOD TABLET, EXTENDED RELEASE ORAL ONCE A DAY
Qty: 90 TABLET | Status: ACTIVE | COMMUNITY

## 2023-03-14 RX ORDER — RISANKIZUMAB-RZAA 60 MG/ML
AS DIRECTED INJECTION INTRAVENOUS
Qty: 3 | Refills: 0 | Status: ACTIVE | COMMUNITY

## 2023-03-14 RX ORDER — CHOLESTYRAMINE 4 G/9G
1 PACKET MIXED WITH WATER OR NON-CARBONATED DRINK POWDER, FOR SUSPENSION ORAL
Qty: 120 | Refills: 2 | Status: ACTIVE | COMMUNITY
Start: 2022-11-29

## 2023-03-14 RX ORDER — DIPHENOXYLATE HYDROCHLORIDE AND ATROPINE SULFATE 2.5; .025 MG/1; MG/1
1 TABLET AS NEEDED TABLET ORAL
Status: ACTIVE | COMMUNITY

## 2023-03-14 RX ORDER — CHOLECALCIFEROL TAB 50 MCG (2000 UNIT) 50 MCG
ONCE PER WEEK TAB ORAL
Status: ACTIVE | COMMUNITY

## 2023-03-15 ENCOUNTER — TELEPHONE ENCOUNTER (OUTPATIENT)
Dept: URBAN - METROPOLITAN AREA CLINIC 44 | Facility: CLINIC | Age: 38
End: 2023-03-15

## 2023-03-15 PROBLEM — 71833008 CROHN'S DISEASE OF SMALL AND LARGE INTESTINES: Status: ACTIVE | Noted: 2023-03-15

## 2023-03-20 ENCOUNTER — WEB ENCOUNTER (OUTPATIENT)
Dept: URBAN - METROPOLITAN AREA CLINIC 44 | Facility: CLINIC | Age: 38
End: 2023-03-20

## 2023-03-21 ENCOUNTER — WEB ENCOUNTER (OUTPATIENT)
Dept: URBAN - METROPOLITAN AREA CLINIC 44 | Facility: CLINIC | Age: 38
End: 2023-03-21

## 2023-04-02 ENCOUNTER — WEB ENCOUNTER (OUTPATIENT)
Dept: URBAN - METROPOLITAN AREA CLINIC 44 | Facility: CLINIC | Age: 38
End: 2023-04-02

## 2023-04-03 ENCOUNTER — WEB ENCOUNTER (OUTPATIENT)
Dept: URBAN - METROPOLITAN AREA CLINIC 44 | Facility: CLINIC | Age: 38
End: 2023-04-03

## 2023-04-04 PROBLEM — 71419002: Status: ACTIVE | Noted: 2023-04-04

## 2023-04-05 ENCOUNTER — WEB ENCOUNTER (OUTPATIENT)
Dept: URBAN - METROPOLITAN AREA CLINIC 44 | Facility: CLINIC | Age: 38
End: 2023-04-05

## 2023-04-07 ENCOUNTER — WEB ENCOUNTER (OUTPATIENT)
Dept: URBAN - METROPOLITAN AREA CLINIC 44 | Facility: CLINIC | Age: 38
End: 2023-04-07

## 2023-04-11 ENCOUNTER — OFFICE VISIT (OUTPATIENT)
Dept: URBAN - METROPOLITAN AREA CLINIC 44 | Facility: CLINIC | Age: 38
End: 2023-04-11

## 2023-04-11 RX ORDER — RISANKIZUMAB-RZAA 60 MG/ML
AS DIRECTED INJECTION INTRAVENOUS
Qty: 3 | Refills: 0 | Status: ACTIVE | COMMUNITY

## 2023-04-11 RX ORDER — MULTIVIT-MIN/FOLIC/VIT K/LYCOP 400-300MCG
1 TABLET TABLET ORAL ONCE A DAY
Status: ACTIVE | COMMUNITY

## 2023-04-11 RX ORDER — CHOLECALCIFEROL TAB 50 MCG (2000 UNIT) 50 MCG
ONCE PER WEEK TAB ORAL
Status: ACTIVE | COMMUNITY

## 2023-04-11 RX ORDER — POTASSIUM CHLORIDE 1500 MG/1
1 TABLET WITH FOOD TABLET, EXTENDED RELEASE ORAL ONCE A DAY
Qty: 90 TABLET | Status: ACTIVE | COMMUNITY

## 2023-04-11 RX ORDER — CHOLESTYRAMINE 4 G/9G
1 PACKET MIXED WITH WATER OR NON-CARBONATED DRINK POWDER, FOR SUSPENSION ORAL
Qty: 120 | Refills: 2 | Status: ACTIVE | COMMUNITY
Start: 2022-11-29

## 2023-04-11 RX ORDER — DIPHENOXYLATE HYDROCHLORIDE AND ATROPINE SULFATE 2.5; .025 MG/1; MG/1
1 TABLET AS NEEDED TABLET ORAL
Status: ACTIVE | COMMUNITY

## 2023-04-11 RX ORDER — CYANOCOBALAMIN 1000 UG/ML
1 ML INJECTION INTRAMUSCULAR; SUBCUTANEOUS ONCE A DAY
Status: ACTIVE | COMMUNITY

## 2023-04-11 RX ORDER — RISANKIZUMAB-RZAA 360 MG/2.4
AS DIRECTED WEARABLE INJECTOR SUBCUTANEOUS
Qty: 3 | Refills: 1 | Status: ACTIVE | COMMUNITY

## 2023-04-11 RX ORDER — VENLAFAXINE 75 MG/1
TAKE 1 TABLET BY MOUTH TWICE DAILY WITH FOOD TABLET ORAL ONCE A DAY
Refills: 0 | Status: ACTIVE | COMMUNITY

## 2023-04-17 ENCOUNTER — WEB ENCOUNTER (OUTPATIENT)
Dept: URBAN - METROPOLITAN AREA CLINIC 44 | Facility: CLINIC | Age: 38
End: 2023-04-17

## 2023-04-19 ENCOUNTER — WEB ENCOUNTER (OUTPATIENT)
Dept: URBAN - METROPOLITAN AREA CLINIC 44 | Facility: CLINIC | Age: 38
End: 2023-04-19

## 2023-04-20 ENCOUNTER — WEB ENCOUNTER (OUTPATIENT)
Dept: URBAN - METROPOLITAN AREA CLINIC 44 | Facility: CLINIC | Age: 38
End: 2023-04-20

## 2023-04-21 ENCOUNTER — WEB ENCOUNTER (OUTPATIENT)
Dept: URBAN - METROPOLITAN AREA CLINIC 44 | Facility: CLINIC | Age: 38
End: 2023-04-21

## 2023-04-24 ENCOUNTER — WEB ENCOUNTER (OUTPATIENT)
Dept: URBAN - METROPOLITAN AREA CLINIC 44 | Facility: CLINIC | Age: 38
End: 2023-04-24

## 2023-04-25 ENCOUNTER — OFFICE VISIT (OUTPATIENT)
Dept: URBAN - METROPOLITAN AREA CLINIC 44 | Facility: CLINIC | Age: 38
End: 2023-04-25
Payer: COMMERCIAL

## 2023-04-25 VITALS
HEART RATE: 85 BPM | SYSTOLIC BLOOD PRESSURE: 107 MMHG | HEIGHT: 73 IN | DIASTOLIC BLOOD PRESSURE: 70 MMHG | WEIGHT: 161.8 LBS | BODY MASS INDEX: 21.44 KG/M2 | TEMPERATURE: 97.3 F

## 2023-04-25 DIAGNOSIS — K50.018 CROHN'S DISEASE OF SMALL INTESTINE WITH OTHER COMPLICATION: ICD-10-CM

## 2023-04-25 PROCEDURE — 99212 OFFICE O/P EST SF 10 MIN: CPT | Performed by: INTERNAL MEDICINE

## 2023-04-25 RX ORDER — DIPHENOXYLATE HYDROCHLORIDE AND ATROPINE SULFATE 2.5; .025 MG/1; MG/1
1 TABLET AS NEEDED TABLET ORAL
Status: ACTIVE | COMMUNITY

## 2023-04-25 RX ORDER — CYANOCOBALAMIN 1000 UG/ML
1 ML INJECTION INTRAMUSCULAR; SUBCUTANEOUS ONCE A DAY
Status: ACTIVE | COMMUNITY

## 2023-04-25 RX ORDER — MULTIVIT-MIN/FOLIC/VIT K/LYCOP 400-300MCG
1 TABLET TABLET ORAL ONCE A DAY
Status: ACTIVE | COMMUNITY

## 2023-04-25 RX ORDER — VEDOLIZUMAB 300 MG/5ML
AS DIRECTED INJECTION, POWDER, LYOPHILIZED, FOR SOLUTION INTRAVENOUS
Status: ACTIVE | COMMUNITY

## 2023-04-25 RX ORDER — CHOLESTYRAMINE 4 G/9G
1 PACKET MIXED WITH WATER OR NON-CARBONATED DRINK POWDER, FOR SUSPENSION ORAL
Qty: 120 | Refills: 2 | Status: ACTIVE | COMMUNITY
Start: 2022-11-29

## 2023-04-25 RX ORDER — POTASSIUM CHLORIDE 1500 MG/1
1 TABLET WITH FOOD TABLET, EXTENDED RELEASE ORAL ONCE A DAY
Qty: 90 TABLET | Status: ACTIVE | COMMUNITY

## 2023-04-25 RX ORDER — VENLAFAXINE 75 MG/1
TAKE 1 TABLET BY MOUTH TWICE DAILY WITH FOOD TABLET ORAL ONCE A DAY
Refills: 0 | Status: ACTIVE | COMMUNITY

## 2023-04-25 RX ORDER — CHOLECALCIFEROL TAB 50 MCG (2000 UNIT) 50 MCG
ONCE PER WEEK TAB ORAL
Status: ACTIVE | COMMUNITY

## 2023-04-25 NOTE — HPI-TODAY'S VISIT:
36 yo M with Crohn's disease, autoimmune hepatitis, Gilbert's who returns for evaluation of Crohn's disease with weight loss and watery stools. He is on Entyvio and appears to be in remission.   He has a complex history of chronic Crohn's disease x 18 years and is s/p total colectomy with terminal ileum resection with ileostomy and rectal pouch. He also has had 2 small bowel resections with 14 inches (total ca. 36 cm) removed each time.  He has been on several medications(Cimzia, Remicade, mesalamine products and steroids). History of non-compliance and has been loss to follow up since we have been treating him.   He has been followed by hepatology for ?AIH. There is some question regarding the validity of this diagnosis.    Over the last 2 months he has been c/o watery diarrhea up to 10 per day and has lost weight. Last month he went on a cruise and had to visit an urgent care center due to severe dehydration. Prior to this he was admitted to the hospital on 12/22 for dehydration and subsequent hypokalemia. We  started him on  TPN for a few weeks. He did remarkably well and gained a significant amount of solid weight.  Weight was hovering 150s(baseline 165). Today, he weighs 160s.  In addition, we implemented Prevalite bid, and he mentions that this seemed to help. He was discharged with TPN. After discontinuing TPN he began to have some continued weight loss.  He now presents for evaluation of new treatment. We offered the patient Gattex and the patient is willing to start the medication.

## 2023-04-25 NOTE — PHYSICAL EXAM HENT:
Delirium    • Minimize duration of delirium Progressing          Impaired Swallowing    • Minimize aspiration risk Progressing          PAIN - ADULT    • Verbalizes/displays adequate comfort level or patient's stated pain goal Progressing          RESPIRAT Head, normocephalic, atraumatic, Face, Face within normal limits, Ears, External ears within normal limits

## 2023-04-27 ENCOUNTER — TELEPHONE ENCOUNTER (OUTPATIENT)
Dept: URBAN - METROPOLITAN AREA CLINIC 46 | Facility: CLINIC | Age: 38
End: 2023-04-27

## 2023-05-01 ENCOUNTER — TELEPHONE ENCOUNTER (OUTPATIENT)
Dept: URBAN - METROPOLITAN AREA CLINIC 46 | Facility: CLINIC | Age: 38
End: 2023-05-01

## 2023-05-02 ENCOUNTER — TELEPHONE ENCOUNTER (OUTPATIENT)
Dept: URBAN - METROPOLITAN AREA CLINIC 46 | Facility: CLINIC | Age: 38
End: 2023-05-02

## 2023-05-02 ENCOUNTER — WEB ENCOUNTER (OUTPATIENT)
Dept: URBAN - METROPOLITAN AREA CLINIC 44 | Facility: CLINIC | Age: 38
End: 2023-05-02

## 2023-05-02 ENCOUNTER — TELEPHONE ENCOUNTER (OUTPATIENT)
Dept: URBAN - METROPOLITAN AREA CLINIC 23 | Facility: CLINIC | Age: 38
End: 2023-05-02

## 2023-05-08 ENCOUNTER — TELEPHONE ENCOUNTER (OUTPATIENT)
Dept: URBAN - METROPOLITAN AREA CLINIC 97 | Facility: CLINIC | Age: 38
End: 2023-05-08

## 2023-05-09 ENCOUNTER — OFFICE VISIT (OUTPATIENT)
Dept: URBAN - METROPOLITAN AREA CLINIC 43 | Facility: CLINIC | Age: 38
End: 2023-05-09

## 2023-05-17 ENCOUNTER — TELEPHONE ENCOUNTER (OUTPATIENT)
Dept: URBAN - METROPOLITAN AREA CLINIC 46 | Facility: CLINIC | Age: 38
End: 2023-05-17

## 2023-05-22 ENCOUNTER — WEB ENCOUNTER (OUTPATIENT)
Dept: URBAN - METROPOLITAN AREA CLINIC 44 | Facility: CLINIC | Age: 38
End: 2023-05-22

## 2023-05-24 ENCOUNTER — WEB ENCOUNTER (OUTPATIENT)
Dept: URBAN - METROPOLITAN AREA CLINIC 44 | Facility: CLINIC | Age: 38
End: 2023-05-24

## 2023-05-26 ENCOUNTER — TELEPHONE ENCOUNTER (OUTPATIENT)
Dept: URBAN - METROPOLITAN AREA CLINIC 46 | Facility: CLINIC | Age: 38
End: 2023-05-26

## 2023-05-30 ENCOUNTER — WEB ENCOUNTER (OUTPATIENT)
Dept: URBAN - METROPOLITAN AREA CLINIC 44 | Facility: CLINIC | Age: 38
End: 2023-05-30

## 2023-05-31 ENCOUNTER — TELEPHONE ENCOUNTER (OUTPATIENT)
Dept: URBAN - METROPOLITAN AREA CLINIC 46 | Facility: CLINIC | Age: 38
End: 2023-05-31

## 2023-05-31 ENCOUNTER — WEB ENCOUNTER (OUTPATIENT)
Dept: URBAN - METROPOLITAN AREA CLINIC 44 | Facility: CLINIC | Age: 38
End: 2023-05-31

## 2023-06-01 ENCOUNTER — TELEPHONE ENCOUNTER (OUTPATIENT)
Dept: URBAN - METROPOLITAN AREA CLINIC 46 | Facility: CLINIC | Age: 38
End: 2023-06-01

## 2023-06-01 PROBLEM — 89362005: Status: ACTIVE | Noted: 2023-06-01

## 2023-06-05 ENCOUNTER — WEB ENCOUNTER (OUTPATIENT)
Dept: URBAN - METROPOLITAN AREA CLINIC 44 | Facility: CLINIC | Age: 38
End: 2023-06-05

## 2023-06-06 ENCOUNTER — WEB ENCOUNTER (OUTPATIENT)
Dept: URBAN - METROPOLITAN AREA CLINIC 44 | Facility: CLINIC | Age: 38
End: 2023-06-06

## 2023-06-07 ENCOUNTER — WEB ENCOUNTER (OUTPATIENT)
Dept: URBAN - METROPOLITAN AREA CLINIC 44 | Facility: CLINIC | Age: 38
End: 2023-06-07

## 2023-06-08 ENCOUNTER — WEB ENCOUNTER (OUTPATIENT)
Dept: URBAN - METROPOLITAN AREA CLINIC 44 | Facility: CLINIC | Age: 38
End: 2023-06-08

## 2023-06-08 ENCOUNTER — TELEPHONE ENCOUNTER (OUTPATIENT)
Dept: URBAN - METROPOLITAN AREA CLINIC 46 | Facility: CLINIC | Age: 38
End: 2023-06-08

## 2023-06-09 ENCOUNTER — WEB ENCOUNTER (OUTPATIENT)
Dept: URBAN - METROPOLITAN AREA CLINIC 44 | Facility: CLINIC | Age: 38
End: 2023-06-09

## 2023-06-12 ENCOUNTER — WEB ENCOUNTER (OUTPATIENT)
Dept: URBAN - METROPOLITAN AREA CLINIC 44 | Facility: CLINIC | Age: 38
End: 2023-06-12

## 2023-06-14 ENCOUNTER — WEB ENCOUNTER (OUTPATIENT)
Dept: URBAN - METROPOLITAN AREA CLINIC 44 | Facility: CLINIC | Age: 38
End: 2023-06-14

## 2023-06-14 ENCOUNTER — TELEPHONE ENCOUNTER (OUTPATIENT)
Dept: URBAN - METROPOLITAN AREA CLINIC 44 | Facility: CLINIC | Age: 38
End: 2023-06-14

## 2023-06-15 ENCOUNTER — WEB ENCOUNTER (OUTPATIENT)
Dept: URBAN - METROPOLITAN AREA CLINIC 48 | Facility: CLINIC | Age: 38
End: 2023-06-15

## 2023-06-15 ENCOUNTER — WEB ENCOUNTER (OUTPATIENT)
Dept: URBAN - METROPOLITAN AREA CLINIC 44 | Facility: CLINIC | Age: 38
End: 2023-06-15

## 2023-06-21 ENCOUNTER — TELEPHONE ENCOUNTER (OUTPATIENT)
Dept: URBAN - METROPOLITAN AREA CLINIC 44 | Facility: CLINIC | Age: 38
End: 2023-06-21

## 2023-06-28 ENCOUNTER — TELEPHONE ENCOUNTER (OUTPATIENT)
Dept: URBAN - METROPOLITAN AREA CLINIC 117 | Facility: CLINIC | Age: 38
End: 2023-06-28

## 2023-06-28 RX ORDER — VEDOLIZUMAB 300 MG/5ML
AS DIRECTED INJECTION, POWDER, LYOPHILIZED, FOR SOLUTION INTRAVENOUS
Qty: 300 | OUTPATIENT
Start: 2023-06-28 | End: 2023-09-26

## 2023-07-02 ENCOUNTER — CLAIMS CREATED FROM THE CLAIM WINDOW (OUTPATIENT)
Dept: URBAN - METROPOLITAN AREA MEDICAL CENTER 35 | Facility: MEDICAL CENTER | Age: 38
End: 2023-07-02

## 2023-07-02 ENCOUNTER — CLAIMS CREATED FROM THE CLAIM WINDOW (OUTPATIENT)
Dept: URBAN - METROPOLITAN AREA MEDICAL CENTER 35 | Facility: MEDICAL CENTER | Age: 38
End: 2023-07-02
Payer: COMMERCIAL

## 2023-07-02 DIAGNOSIS — K91.2 SHORT GUT SYNDROME: ICD-10-CM

## 2023-07-02 DIAGNOSIS — K50.018 CROHN'S DISEASE OF DUODENUM WITH OTHER COMPLICATION: ICD-10-CM

## 2023-07-02 DIAGNOSIS — R93.3 ABN FINDINGS-GI TRACT: ICD-10-CM

## 2023-07-02 PROCEDURE — 99222 1ST HOSP IP/OBS MODERATE 55: CPT | Performed by: INTERNAL MEDICINE

## 2023-07-07 ENCOUNTER — OFFICE VISIT (OUTPATIENT)
Dept: URBAN - METROPOLITAN AREA CLINIC 117 | Facility: CLINIC | Age: 38
End: 2023-07-07
Payer: COMMERCIAL

## 2023-07-07 VITALS
TEMPERATURE: 98.1 F | HEART RATE: 96 BPM | DIASTOLIC BLOOD PRESSURE: 79 MMHG | SYSTOLIC BLOOD PRESSURE: 115 MMHG | RESPIRATION RATE: 20 BRPM | BODY MASS INDEX: 24.7 KG/M2 | HEIGHT: 73 IN | WEIGHT: 186.4 LBS

## 2023-07-07 DIAGNOSIS — K50.80 CROHN'S COLITIS: ICD-10-CM

## 2023-07-07 PROCEDURE — 96413 CHEMO IV INFUSION 1 HR: CPT | Performed by: INTERNAL MEDICINE

## 2023-07-07 RX ORDER — VEDOLIZUMAB 300 MG/5ML
AS DIRECTED INJECTION, POWDER, LYOPHILIZED, FOR SOLUTION INTRAVENOUS
Qty: 300 | Status: ACTIVE | COMMUNITY
Start: 2023-06-28 | End: 2023-09-26

## 2023-07-07 RX ORDER — CHOLESTYRAMINE 4 G/9G
1 PACKET MIXED WITH WATER OR NON-CARBONATED DRINK POWDER, FOR SUSPENSION ORAL
Qty: 120 | Refills: 2 | Status: ACTIVE | COMMUNITY
Start: 2022-11-29

## 2023-07-07 RX ORDER — DIPHENOXYLATE HYDROCHLORIDE AND ATROPINE SULFATE 2.5; .025 MG/1; MG/1
1 TABLET AS NEEDED TABLET ORAL
Status: ACTIVE | COMMUNITY

## 2023-07-07 RX ORDER — MULTIVIT-MIN/FOLIC/VIT K/LYCOP 400-300MCG
1 TABLET TABLET ORAL ONCE A DAY
Status: ACTIVE | COMMUNITY

## 2023-07-07 RX ORDER — CYANOCOBALAMIN 1000 UG/ML
1 ML INJECTION INTRAMUSCULAR; SUBCUTANEOUS ONCE A DAY
Status: ACTIVE | COMMUNITY

## 2023-07-07 RX ORDER — VEDOLIZUMAB 300 MG/5ML
AS DIRECTED INJECTION, POWDER, LYOPHILIZED, FOR SOLUTION INTRAVENOUS
Status: ACTIVE | COMMUNITY

## 2023-07-07 RX ORDER — POTASSIUM CHLORIDE 1500 MG/1
1 TABLET WITH FOOD TABLET, EXTENDED RELEASE ORAL ONCE A DAY
Qty: 90 TABLET | Status: ACTIVE | COMMUNITY

## 2023-07-07 RX ORDER — VENLAFAXINE 75 MG/1
TAKE 1 TABLET BY MOUTH TWICE DAILY WITH FOOD TABLET ORAL ONCE A DAY
Refills: 0 | Status: ACTIVE | COMMUNITY

## 2023-07-07 RX ORDER — CHOLECALCIFEROL TAB 50 MCG (2000 UNIT) 50 MCG
ONCE PER WEEK TAB ORAL
Status: ACTIVE | COMMUNITY

## 2023-08-29 ENCOUNTER — OFFICE VISIT (OUTPATIENT)
Dept: URBAN - METROPOLITAN AREA CLINIC 43 | Facility: CLINIC | Age: 38
End: 2023-08-29
Payer: COMMERCIAL

## 2023-08-29 VITALS
HEIGHT: 73 IN | TEMPERATURE: 97.5 F | RESPIRATION RATE: 20 BRPM | BODY MASS INDEX: 23.46 KG/M2 | HEART RATE: 81 BPM | DIASTOLIC BLOOD PRESSURE: 63 MMHG | WEIGHT: 177 LBS | SYSTOLIC BLOOD PRESSURE: 116 MMHG

## 2023-08-29 DIAGNOSIS — K50.80 CROHN'S COLITIS: ICD-10-CM

## 2023-08-29 PROCEDURE — 96413 CHEMO IV INFUSION 1 HR: CPT | Performed by: INTERNAL MEDICINE

## 2023-08-29 RX ORDER — POTASSIUM CHLORIDE 1500 MG/1
1 TABLET WITH FOOD TABLET, EXTENDED RELEASE ORAL ONCE A DAY
Qty: 90 TABLET | Status: ACTIVE | COMMUNITY

## 2023-08-29 RX ORDER — VEDOLIZUMAB 300 MG/5ML
AS DIRECTED INJECTION, POWDER, LYOPHILIZED, FOR SOLUTION INTRAVENOUS
Qty: 300 | Status: ACTIVE | COMMUNITY
Start: 2023-06-28 | End: 2023-09-26

## 2023-08-29 RX ORDER — DIPHENOXYLATE HYDROCHLORIDE AND ATROPINE SULFATE 2.5; .025 MG/1; MG/1
1 TABLET AS NEEDED TABLET ORAL
Status: ACTIVE | COMMUNITY

## 2023-08-29 RX ORDER — VEDOLIZUMAB 300 MG/5ML
AS DIRECTED INJECTION, POWDER, LYOPHILIZED, FOR SOLUTION INTRAVENOUS
Status: ACTIVE | COMMUNITY

## 2023-08-29 RX ORDER — VENLAFAXINE 75 MG/1
TAKE 1 TABLET BY MOUTH TWICE DAILY WITH FOOD TABLET ORAL ONCE A DAY
Refills: 0 | Status: ACTIVE | COMMUNITY

## 2023-08-29 RX ORDER — CHOLECALCIFEROL TAB 50 MCG (2000 UNIT) 50 MCG
ONCE PER WEEK TAB ORAL
Status: ACTIVE | COMMUNITY

## 2023-08-29 RX ORDER — CHOLESTYRAMINE 4 G/9G
1 PACKET MIXED WITH WATER OR NON-CARBONATED DRINK POWDER, FOR SUSPENSION ORAL
Qty: 120 | Refills: 2 | Status: ACTIVE | COMMUNITY
Start: 2022-11-29

## 2023-08-29 RX ORDER — CYANOCOBALAMIN 1000 UG/ML
1 ML INJECTION INTRAMUSCULAR; SUBCUTANEOUS ONCE A DAY
Status: ACTIVE | COMMUNITY

## 2023-08-29 RX ORDER — MULTIVIT-MIN/FOLIC/VIT K/LYCOP 400-300MCG
1 TABLET TABLET ORAL ONCE A DAY
Status: ACTIVE | COMMUNITY

## 2023-09-14 ENCOUNTER — WEB ENCOUNTER (OUTPATIENT)
Dept: URBAN - METROPOLITAN AREA CLINIC 44 | Facility: CLINIC | Age: 38
End: 2023-09-14

## 2023-09-15 ENCOUNTER — WEB ENCOUNTER (OUTPATIENT)
Dept: URBAN - METROPOLITAN AREA CLINIC 44 | Facility: CLINIC | Age: 38
End: 2023-09-15

## 2023-10-24 ENCOUNTER — OFFICE VISIT (OUTPATIENT)
Dept: URBAN - METROPOLITAN AREA CLINIC 43 | Facility: CLINIC | Age: 38
End: 2023-10-24
Payer: COMMERCIAL

## 2023-10-24 VITALS
HEIGHT: 73 IN | HEART RATE: 78 BPM | TEMPERATURE: 98.9 F | DIASTOLIC BLOOD PRESSURE: 63 MMHG | RESPIRATION RATE: 18 BRPM | BODY MASS INDEX: 23.14 KG/M2 | WEIGHT: 174.6 LBS | SYSTOLIC BLOOD PRESSURE: 108 MMHG

## 2023-10-24 DIAGNOSIS — K50.80 CROHN'S COLITIS: ICD-10-CM

## 2023-10-24 PROCEDURE — 96413 CHEMO IV INFUSION 1 HR: CPT | Performed by: INTERNAL MEDICINE

## 2023-10-24 RX ORDER — VENLAFAXINE 75 MG/1
TAKE 1 TABLET BY MOUTH TWICE DAILY WITH FOOD TABLET ORAL ONCE A DAY
Refills: 0 | Status: ACTIVE | COMMUNITY

## 2023-10-24 RX ORDER — DIPHENOXYLATE HYDROCHLORIDE AND ATROPINE SULFATE 2.5; .025 MG/1; MG/1
1 TABLET AS NEEDED TABLET ORAL
Status: ACTIVE | COMMUNITY

## 2023-10-24 RX ORDER — VEDOLIZUMAB 300 MG/5ML
AS DIRECTED INJECTION, POWDER, LYOPHILIZED, FOR SOLUTION INTRAVENOUS
Status: ACTIVE | COMMUNITY

## 2023-10-24 RX ORDER — CHOLESTYRAMINE 4 G/9G
1 PACKET MIXED WITH WATER OR NON-CARBONATED DRINK POWDER, FOR SUSPENSION ORAL
Qty: 120 | Refills: 2 | Status: ACTIVE | COMMUNITY
Start: 2022-11-29

## 2023-10-24 RX ORDER — CYANOCOBALAMIN 1000 UG/ML
1 ML INJECTION INTRAMUSCULAR; SUBCUTANEOUS ONCE A DAY
Status: ACTIVE | COMMUNITY

## 2023-10-24 RX ORDER — MULTIVIT-MIN/FOLIC/VIT K/LYCOP 400-300MCG
1 TABLET TABLET ORAL ONCE A DAY
Status: ACTIVE | COMMUNITY

## 2023-10-24 RX ORDER — CHOLECALCIFEROL TAB 50 MCG (2000 UNIT) 50 MCG
ONCE PER WEEK TAB ORAL
Status: ACTIVE | COMMUNITY

## 2023-10-24 RX ORDER — POTASSIUM CHLORIDE 1500 MG/1
1 TABLET WITH FOOD TABLET, EXTENDED RELEASE ORAL ONCE A DAY
Qty: 90 TABLET | Status: ACTIVE | COMMUNITY

## 2023-11-13 ENCOUNTER — TELEPHONE ENCOUNTER (OUTPATIENT)
Dept: URBAN - METROPOLITAN AREA CLINIC 46 | Facility: CLINIC | Age: 38
End: 2023-11-13

## 2023-12-19 ENCOUNTER — OFFICE VISIT (OUTPATIENT)
Dept: URBAN - METROPOLITAN AREA CLINIC 43 | Facility: CLINIC | Age: 38
End: 2023-12-19

## 2023-12-21 ENCOUNTER — TELEPHONE ENCOUNTER (OUTPATIENT)
Dept: URBAN - METROPOLITAN AREA CLINIC 97 | Facility: CLINIC | Age: 38
End: 2023-12-21

## 2023-12-29 ENCOUNTER — OFFICE VISIT (OUTPATIENT)
Dept: URBAN - METROPOLITAN AREA CLINIC 117 | Facility: CLINIC | Age: 38
End: 2023-12-29
Payer: COMMERCIAL

## 2023-12-29 VITALS
SYSTOLIC BLOOD PRESSURE: 98 MMHG | TEMPERATURE: 97.7 F | BODY MASS INDEX: 23.35 KG/M2 | DIASTOLIC BLOOD PRESSURE: 67 MMHG | HEIGHT: 73 IN | RESPIRATION RATE: 20 BRPM | HEART RATE: 84 BPM | WEIGHT: 176.2 LBS

## 2023-12-29 DIAGNOSIS — K50.80 CROHN'S COLITIS: ICD-10-CM

## 2023-12-29 PROCEDURE — 96413 CHEMO IV INFUSION 1 HR: CPT | Performed by: INTERNAL MEDICINE

## 2023-12-29 RX ORDER — VEDOLIZUMAB 300 MG/5ML
AS DIRECTED INJECTION, POWDER, LYOPHILIZED, FOR SOLUTION INTRAVENOUS
Status: ACTIVE | COMMUNITY

## 2023-12-29 RX ORDER — DIPHENOXYLATE HYDROCHLORIDE AND ATROPINE SULFATE 2.5; .025 MG/1; MG/1
1 TABLET AS NEEDED TABLET ORAL
Status: ACTIVE | COMMUNITY

## 2023-12-29 RX ORDER — CYANOCOBALAMIN 1000 UG/ML
1 ML INJECTION INTRAMUSCULAR; SUBCUTANEOUS ONCE A DAY
Status: ACTIVE | COMMUNITY

## 2023-12-29 RX ORDER — POTASSIUM CHLORIDE 1500 MG/1
1 TABLET WITH FOOD TABLET, EXTENDED RELEASE ORAL ONCE A DAY
Qty: 90 TABLET | Status: ACTIVE | COMMUNITY

## 2023-12-29 RX ORDER — MULTIVIT-MIN/FOLIC/VIT K/LYCOP 400-300MCG
1 TABLET TABLET ORAL ONCE A DAY
Status: ACTIVE | COMMUNITY

## 2023-12-29 RX ORDER — VENLAFAXINE 75 MG/1
TAKE 1 TABLET BY MOUTH TWICE DAILY WITH FOOD TABLET ORAL ONCE A DAY
Refills: 0 | Status: ACTIVE | COMMUNITY

## 2023-12-29 RX ORDER — CHOLECALCIFEROL TAB 50 MCG (2000 UNIT) 50 MCG
ONCE PER WEEK TAB ORAL
Status: ACTIVE | COMMUNITY

## 2023-12-29 RX ORDER — CHOLESTYRAMINE 4 G/9G
1 PACKET MIXED WITH WATER OR NON-CARBONATED DRINK POWDER, FOR SUSPENSION ORAL
Qty: 120 | Refills: 2 | Status: ACTIVE | COMMUNITY
Start: 2022-11-29

## 2024-01-30 ENCOUNTER — LAB OUTSIDE AN ENCOUNTER (OUTPATIENT)
Dept: URBAN - METROPOLITAN AREA CLINIC 48 | Facility: CLINIC | Age: 39
End: 2024-01-30

## 2024-01-30 ENCOUNTER — OFFICE VISIT (OUTPATIENT)
Dept: URBAN - METROPOLITAN AREA CLINIC 48 | Facility: CLINIC | Age: 39
End: 2024-01-30
Payer: COMMERCIAL

## 2024-01-30 VITALS
DIASTOLIC BLOOD PRESSURE: 77 MMHG | HEIGHT: 73 IN | SYSTOLIC BLOOD PRESSURE: 112 MMHG | TEMPERATURE: 98.6 F | HEART RATE: 85 BPM | WEIGHT: 171.6 LBS | BODY MASS INDEX: 22.74 KG/M2

## 2024-01-30 DIAGNOSIS — K75.4 AUTOIMMUNE HEPATITIS: ICD-10-CM

## 2024-01-30 DIAGNOSIS — R79.89 ELEVATED LFTS: ICD-10-CM

## 2024-01-30 DIAGNOSIS — K50.80 CROHN'S COLITIS: ICD-10-CM

## 2024-01-30 PROBLEM — 863927004: Status: ACTIVE | Noted: 2024-01-30

## 2024-01-30 PROCEDURE — 99214 OFFICE O/P EST MOD 30 MIN: CPT | Performed by: INTERNAL MEDICINE

## 2024-01-30 RX ORDER — MULTIVIT-MIN/FOLIC/VIT K/LYCOP 400-300MCG
AS DIRECTED TABLET ORAL ONCE A DAY
Status: ACTIVE | COMMUNITY

## 2024-01-30 RX ORDER — VEDOLIZUMAB 300 MG/5ML
AS DIRECTED INJECTION, POWDER, LYOPHILIZED, FOR SOLUTION INTRAVENOUS
OUTPATIENT
Start: 2024-01-30

## 2024-01-30 RX ORDER — POTASSIUM CHLORIDE 1500 MG/1
1 TABLET WITH FOOD TABLET, EXTENDED RELEASE ORAL ONCE A DAY
Qty: 90 TABLET | Status: ACTIVE | COMMUNITY

## 2024-01-30 RX ORDER — CHOLESTYRAMINE 4 G/9G
1 PACKET MIXED WITH WATER OR NON-CARBONATED DRINK POWDER, FOR SUSPENSION ORAL
Qty: 60 | Refills: 3 | OUTPATIENT
Start: 2024-01-30

## 2024-01-30 RX ORDER — CHOLECALCIFEROL TAB 50 MCG (2000 UNIT) 50 MCG
ONCE PER WEEK TAB ORAL
Status: ACTIVE | COMMUNITY

## 2024-01-30 RX ORDER — CYANOCOBALAMIN 1000 UG/ML
1 ML INJECTION INTRAMUSCULAR; SUBCUTANEOUS ONCE A DAY
Status: ACTIVE | COMMUNITY

## 2024-01-30 RX ORDER — TEDUGLUTIDE 5 MG
AS DIRECTED KIT SUBCUTANEOUS
Status: ACTIVE | COMMUNITY

## 2024-01-30 RX ORDER — DIPHENOXYLATE HYDROCHLORIDE AND ATROPINE SULFATE 2.5; .025 MG/1; MG/1
1 TABLET AS NEEDED TABLET ORAL
Status: ACTIVE | COMMUNITY

## 2024-01-30 RX ORDER — VEDOLIZUMAB 300 MG/5ML
AS DIRECTED INJECTION, POWDER, LYOPHILIZED, FOR SOLUTION INTRAVENOUS
Status: ACTIVE | COMMUNITY

## 2024-01-30 RX ORDER — CHOLESTYRAMINE 4 G/9G
1 PACKET MIXED WITH WATER OR NON-CARBONATED DRINK POWDER, FOR SUSPENSION ORAL
Qty: 120 | Refills: 2 | Status: ACTIVE | COMMUNITY
Start: 2022-11-29

## 2024-01-30 RX ORDER — VENLAFAXINE 75 MG/1
1 TABLET WITH FOOD TABLET ORAL ONCE A DAY
Qty: 90 TABLET | Refills: 0 | Status: ACTIVE | COMMUNITY

## 2024-01-30 NOTE — HPI-TODAY'S VISIT:
39 yo M with Crohn's disease, autoimmune hepatitis, Gilbert's who returns for evaluation of Crohn's disease with weight loss and watery stools. He is on Entyvio every 8 weeks.  He has a complex history of chronic Crohn's disease x 18 years and is s/p total colectomy with terminal ileum resection with ileostomy and rectal pouch. He also has had 2 small bowel resections with 14 inches (total ca. 36 cm) removed each time.  He has been on several medications(Cimzia, Remicade, mesalamine products and steroids). History of non-compliance and has been loss to follow up intermittently since we have been treating him.   He was seen hepatology about 5 years ago for ?AIH. There is some question regarding the validity of this diagnosis. LFTs in the last year have been intermittently elevated. 1/17/24 LFTs show [id number], AST 50, alkaline phosphatase 139, and tbili 1.4.   The patient has been to the hospital multiple times for dehydration secondary to diarrhea and weight loss. He continues to take TPN 2-3 times a week and IVF 2 times a week. His weight is 171lb. He started Gattex 4 months ago. Bowel movements are multiple times a day.  He has noted 2 months of intermittent nausea and chills but has not noted triggers. He mentions knee pain recently. The patient has not been on Cholestyramine in the past but has not taken it recently. He goes to the infusion center weekly for labs and PICC line dressing change.

## 2024-01-30 NOTE — PHYSICAL EXAM SKIN:
PICC line right arm, no rashes , no jaundice present , good turgor , no masses , no tenderness on palpation

## 2024-01-30 NOTE — PHYSICAL EXAM GASTROINTESTINAL
Abdomen , soft, nontender, nondistended , no guarding or rigidity , no masses palpable , normal bowel sounds, ileostomy bag  Liver and Spleen , no hepatomegaly present , no hepatosplenomegaly , liver nontender , spleen not palpable

## 2024-05-01 ENCOUNTER — TELEPHONE ENCOUNTER (OUTPATIENT)
Dept: URBAN - METROPOLITAN AREA CLINIC 48 | Facility: CLINIC | Age: 39
End: 2024-05-01

## 2024-05-02 ENCOUNTER — DASHBOARD ENCOUNTERS (OUTPATIENT)
Age: 39
End: 2024-05-02

## 2024-05-07 ENCOUNTER — OFFICE VISIT (OUTPATIENT)
Dept: URBAN - METROPOLITAN AREA CLINIC 44 | Facility: CLINIC | Age: 39
End: 2024-05-07

## 2024-05-07 ENCOUNTER — TELEPHONE ENCOUNTER (OUTPATIENT)
Dept: URBAN - METROPOLITAN AREA CLINIC 48 | Facility: CLINIC | Age: 39
End: 2024-05-07

## 2024-05-07 RX ORDER — VEDOLIZUMAB 300 MG/5ML
AS DIRECTED INJECTION, POWDER, LYOPHILIZED, FOR SOLUTION INTRAVENOUS
Status: ACTIVE | COMMUNITY

## 2024-05-07 RX ORDER — CHOLECALCIFEROL TAB 50 MCG (2000 UNIT) 50 MCG
ONCE PER WEEK TAB ORAL
Status: ACTIVE | COMMUNITY

## 2024-05-07 RX ORDER — VENLAFAXINE 75 MG/1
1 TABLET WITH FOOD TABLET ORAL ONCE A DAY
Qty: 90 TABLET | Refills: 0 | Status: ACTIVE | COMMUNITY

## 2024-05-07 RX ORDER — TEDUGLUTIDE 5 MG
AS DIRECTED KIT SUBCUTANEOUS
Status: ACTIVE | COMMUNITY

## 2024-05-07 RX ORDER — CYANOCOBALAMIN 1000 UG/ML
1 ML INJECTION INTRAMUSCULAR; SUBCUTANEOUS ONCE A DAY
Status: ACTIVE | COMMUNITY

## 2024-05-07 RX ORDER — MULTIVIT-MIN/FOLIC/VIT K/LYCOP 400-300MCG
AS DIRECTED TABLET ORAL ONCE A DAY
Status: ACTIVE | COMMUNITY

## 2024-05-07 RX ORDER — DIPHENOXYLATE HYDROCHLORIDE AND ATROPINE SULFATE 2.5; .025 MG/1; MG/1
1 TABLET AS NEEDED TABLET ORAL
Status: ACTIVE | COMMUNITY

## 2024-05-07 RX ORDER — CETIRIZINE HYDROCHLORIDE 10 MG/1
1 TABLET TABLET, FILM COATED ORAL ONCE A DAY
Status: ACTIVE | COMMUNITY

## 2024-05-07 RX ORDER — POTASSIUM CHLORIDE 1500 MG/1
1 TABLET WITH FOOD TABLET, EXTENDED RELEASE ORAL ONCE A DAY
Qty: 90 TABLET | Status: ACTIVE | COMMUNITY

## 2024-05-15 ENCOUNTER — TELEPHONE ENCOUNTER (OUTPATIENT)
Dept: URBAN - METROPOLITAN AREA CLINIC 48 | Facility: CLINIC | Age: 39
End: 2024-05-15

## 2024-05-21 ENCOUNTER — TELEPHONE ENCOUNTER (OUTPATIENT)
Dept: URBAN - METROPOLITAN AREA CLINIC 23 | Facility: CLINIC | Age: 39
End: 2024-05-21

## 2024-05-24 ENCOUNTER — TELEPHONE ENCOUNTER (OUTPATIENT)
Dept: URBAN - METROPOLITAN AREA CLINIC 46 | Facility: CLINIC | Age: 39
End: 2024-05-24

## 2024-06-06 ENCOUNTER — TELEPHONE ENCOUNTER (OUTPATIENT)
Dept: URBAN - METROPOLITAN AREA CLINIC 48 | Facility: CLINIC | Age: 39
End: 2024-06-06

## 2024-06-07 ENCOUNTER — TELEPHONE ENCOUNTER (OUTPATIENT)
Dept: URBAN - METROPOLITAN AREA CLINIC 48 | Facility: CLINIC | Age: 39
End: 2024-06-07

## 2024-06-07 RX ORDER — VEDOLIZUMAB 300 MG/5ML
AS DIRECTED INJECTION, POWDER, LYOPHILIZED, FOR SOLUTION INTRAVENOUS
Qty: 3 | Refills: 3
Start: 2024-01-30 | End: 2025-02-04

## 2024-06-11 ENCOUNTER — TELEPHONE ENCOUNTER (OUTPATIENT)
Dept: URBAN - METROPOLITAN AREA CLINIC 46 | Facility: CLINIC | Age: 39
End: 2024-06-11

## 2024-06-13 ENCOUNTER — TELEPHONE ENCOUNTER (OUTPATIENT)
Dept: URBAN - METROPOLITAN AREA CLINIC 46 | Facility: CLINIC | Age: 39
End: 2024-06-13

## 2024-06-17 ENCOUNTER — TELEPHONE ENCOUNTER (OUTPATIENT)
Dept: URBAN - METROPOLITAN AREA CLINIC 46 | Facility: CLINIC | Age: 39
End: 2024-06-17

## 2024-06-21 ENCOUNTER — LAB OUTSIDE AN ENCOUNTER (OUTPATIENT)
Dept: URBAN - METROPOLITAN AREA CLINIC 46 | Facility: CLINIC | Age: 39
End: 2024-06-21

## 2024-06-21 ENCOUNTER — OFFICE VISIT (OUTPATIENT)
Dept: URBAN - METROPOLITAN AREA CLINIC 46 | Facility: CLINIC | Age: 39
End: 2024-06-21
Payer: COMMERCIAL

## 2024-06-21 VITALS
TEMPERATURE: 98.2 F | DIASTOLIC BLOOD PRESSURE: 73 MMHG | WEIGHT: 169 LBS | HEART RATE: 71 BPM | HEIGHT: 73 IN | SYSTOLIC BLOOD PRESSURE: 106 MMHG | BODY MASS INDEX: 22.4 KG/M2

## 2024-06-21 DIAGNOSIS — R94.5 ELEVATED LIVER FUNCTION TESTS: ICD-10-CM

## 2024-06-21 DIAGNOSIS — K50.80 CROHN'S COLITIS: ICD-10-CM

## 2024-06-21 PROCEDURE — 99214 OFFICE O/P EST MOD 30 MIN: CPT | Performed by: INTERNAL MEDICINE

## 2024-06-21 RX ORDER — CYANOCOBALAMIN 1000 UG/ML
1 ML INJECTION INTRAMUSCULAR; SUBCUTANEOUS ONCE A DAY
Status: ACTIVE | COMMUNITY

## 2024-06-21 RX ORDER — VEDOLIZUMAB 300 MG/5ML
AS DIRECTED INJECTION, POWDER, LYOPHILIZED, FOR SOLUTION INTRAVENOUS
Qty: 2 | Refills: 5 | OUTPATIENT
Start: 2024-06-21 | End: 2025-05-11

## 2024-06-21 RX ORDER — VEDOLIZUMAB 300 MG/5ML
AS DIRECTED INJECTION, POWDER, LYOPHILIZED, FOR SOLUTION INTRAVENOUS
Qty: 3 | Refills: 3 | Status: ACTIVE | COMMUNITY
Start: 2024-01-30 | End: 2025-02-04

## 2024-06-21 RX ORDER — MULTIVIT-MIN/FOLIC/VIT K/LYCOP 400-300MCG
AS DIRECTED TABLET ORAL ONCE A DAY
Status: ACTIVE | COMMUNITY

## 2024-06-21 RX ORDER — CETIRIZINE HYDROCHLORIDE 10 MG/1
1 TABLET TABLET, FILM COATED ORAL ONCE A DAY
Status: ACTIVE | COMMUNITY

## 2024-06-21 RX ORDER — CHOLESTYRAMINE 4 G/9G
1 PACKET MIXED WITH WATER OR NON-CARBONATED DRINK POWDER, FOR SUSPENSION ORAL
Qty: 120 | Refills: 2 | Status: ACTIVE | COMMUNITY
Start: 2022-11-29

## 2024-06-21 RX ORDER — VENLAFAXINE 75 MG/1
1 TABLET WITH FOOD TABLET ORAL ONCE A DAY
Qty: 90 TABLET | Refills: 0 | Status: ACTIVE | COMMUNITY

## 2024-06-21 RX ORDER — CHOLECALCIFEROL TAB 50 MCG (2000 UNIT) 50 MCG
ONCE PER WEEK TAB ORAL
Status: ACTIVE | COMMUNITY

## 2024-06-21 RX ORDER — ONDANSETRON 4 MG/1
1 TABLET ON THE TONGUE AND ALLOW TO DISSOLVE TABLET, ORALLY DISINTEGRATING ORAL ONCE A DAY
Qty: 30 | Refills: 3 | OUTPATIENT
Start: 2024-06-21

## 2024-06-21 RX ORDER — DIPHENOXYLATE HYDROCHLORIDE AND ATROPINE SULFATE 2.5; .025 MG/1; MG/1
1 TABLET AS NEEDED TABLET ORAL
Status: ACTIVE | COMMUNITY

## 2024-06-21 RX ORDER — VEDOLIZUMAB 300 MG/5ML
AS DIRECTED INJECTION, POWDER, LYOPHILIZED, FOR SOLUTION INTRAVENOUS
Status: ACTIVE | COMMUNITY

## 2024-06-21 RX ORDER — CHOLESTYRAMINE 4 G/9G
1 PACKET MIXED WITH WATER OR NON-CARBONATED DRINK POWDER, FOR SUSPENSION ORAL
Qty: 60 | Refills: 3 | Status: ACTIVE | COMMUNITY
Start: 2024-01-30

## 2024-06-21 RX ORDER — DIPHENOXYLATE HYDROCHLORIDE AND ATROPINE SULFATE 2.5; .025 MG/1; MG/1
1 TABLET AS NEEDED TABLET ORAL
Qty: 112 TABLET | Refills: 3 | OUTPATIENT
Start: 2024-06-21 | End: 2024-10-11

## 2024-06-21 RX ORDER — POTASSIUM CHLORIDE 1500 MG/1
1 TABLET WITH FOOD TABLET, EXTENDED RELEASE ORAL ONCE A DAY
Qty: 90 TABLET | Status: ACTIVE | COMMUNITY

## 2024-06-21 RX ORDER — CHOLESTYRAMINE 4 G/9G
1 PACKET MIXED WITH WATER OR NON-CARBONATED DRINK POWDER, FOR SUSPENSION ORAL ONCE A DAY
Qty: 30 | Refills: 3 | OUTPATIENT
Start: 2024-06-21

## 2024-06-21 RX ORDER — TEDUGLUTIDE 5 MG
AS DIRECTED KIT SUBCUTANEOUS
Status: ACTIVE | COMMUNITY

## 2024-06-21 RX ORDER — VEDOLIZUMAB 300 MG/5ML
AS DIRECTED INJECTION, POWDER, LYOPHILIZED, FOR SOLUTION INTRAVENOUS
OUTPATIENT
Start: 2024-06-21

## 2024-06-21 NOTE — HPI-TODAY'S VISIT:
38-year-old male presents for follow-up.  He has history of Crohn's disease, autoimmune hepatitis, and Gilbert's.  Crohn's disease x 18 years status post total colectomy with terminal ileum resection, ileostomy and rectal pouch.  He also has had 2 small bowel resections with 14 inches removed each time.  He has been on several medications in the past including Cimzia, Remicade, mesalamine, steroids.  History of noncompliance and has been lost to follow-up intermittently.  Patient has been to the hospital multiple times for dehydration secondary to diarrhea.  He has been Rx'd cholestyramine in the past.  Continues TPN via PICC line 2-3 times per week, IV fluids 2 times weekly.  Last year he started Gattex.  Currently on Entyvio IV every 8 weeks.  At last OV, started cholestyramine, and refilled Entyvio.  Last EGD 3/16/2022 showed nonerosive gastritis, consistent with portal hypertensive gastropathy.  Colonoscopy done at the same time showed rectal stricture, single ulcer and stricture in the area at 16 cm proximal to the stoma.  No specimens collected.  Recommended repeat colonoscopy to evaluate response to therapy.  QuantiFERON-TB test 5/30/2024 was negative. CRP 5/4/24 was normal. CBC 5/31/2024 with elevated tbili 2.3, hemoglobin 13.3 with normal MCV.  CMP 5/23/2024 showed tbili 3.9, ALT 77, creatinine 1.29 (previously 1.17 on 5/31).  These labs also showed slight leukocytosis 11.8.  Stool studies done 5/28/2024 showed normal calpro 35.5.  Patient went to hospital 4/8/2024 for generalized weakness, found to have leukocytosis to 18,000. Patient noted abdominal pain, increased output from ostomy, and was ?overdue for Entyvio infusion. CT chest/abdomen/pelvis was unremarkable.  LFTs also elevated, with ALT to 207, AST to 116, alk phos to 155, tbili to 4.7.  Creatinine was elevated to 1.76.  Patient diagnosed with sepsis thought to potentially be due to PICC line infection and admitted.  Discharged 4/10/2024 on Lomotil. Since hospitalization, patient reports one episode of decreased ostomy output, decreased appetite, which he believes may be related to a stricture. He also reports crampy periumbilical abd pain and nausea, which is occuring approx 1x/week. Denies black ostomy output or visible blood in bag. He states he was losing weight (5-10 lbs in several weeks), but he is now regaining it. He has not had Entyvio infusion since last winter due to issue with labs (CRP and fecal calpro).   No heart, lung, kidney issues. No pacemaker/defibrillator, home O2, dialysis. No blood thinner use, and patient is not diabetic.

## 2024-07-16 ENCOUNTER — TELEPHONE ENCOUNTER (OUTPATIENT)
Dept: URBAN - METROPOLITAN AREA CLINIC 44 | Facility: CLINIC | Age: 39
End: 2024-07-16

## 2024-07-30 ENCOUNTER — OFFICE VISIT (OUTPATIENT)
Dept: URBAN - METROPOLITAN AREA CLINIC 43 | Facility: CLINIC | Age: 39
End: 2024-07-30
Payer: COMMERCIAL

## 2024-07-30 VITALS
TEMPERATURE: 99 F | HEART RATE: 74 BPM | SYSTOLIC BLOOD PRESSURE: 128 MMHG | BODY MASS INDEX: 22.85 KG/M2 | DIASTOLIC BLOOD PRESSURE: 83 MMHG | WEIGHT: 172.4 LBS | RESPIRATION RATE: 16 BRPM | HEIGHT: 73 IN

## 2024-07-30 DIAGNOSIS — K50.80 CROHN'S COLITIS: ICD-10-CM

## 2024-07-30 PROCEDURE — 96413 CHEMO IV INFUSION 1 HR: CPT | Performed by: INTERNAL MEDICINE

## 2024-07-30 RX ORDER — CHOLECALCIFEROL TAB 50 MCG (2000 UNIT) 50 MCG
ONCE PER WEEK TAB ORAL
Status: ACTIVE | COMMUNITY

## 2024-07-30 RX ORDER — POTASSIUM CHLORIDE 1500 MG/1
1 TABLET WITH FOOD TABLET, EXTENDED RELEASE ORAL ONCE A DAY
Qty: 90 TABLET | Status: ACTIVE | COMMUNITY

## 2024-07-30 RX ORDER — CHOLESTYRAMINE 4 G/9G
1 PACKET MIXED WITH WATER OR NON-CARBONATED DRINK POWDER, FOR SUSPENSION ORAL ONCE A DAY
Qty: 30 | Refills: 3 | Status: ACTIVE | COMMUNITY
Start: 2024-06-21

## 2024-07-30 RX ORDER — DIPHENOXYLATE HYDROCHLORIDE AND ATROPINE SULFATE 2.5; .025 MG/1; MG/1
1 TABLET AS NEEDED TABLET ORAL
Status: ACTIVE | COMMUNITY

## 2024-07-30 RX ORDER — VEDOLIZUMAB 300 MG/5ML
AS DIRECTED INJECTION, POWDER, LYOPHILIZED, FOR SOLUTION INTRAVENOUS
Status: ACTIVE | COMMUNITY

## 2024-07-30 RX ORDER — CHOLESTYRAMINE 4 G/9G
1 PACKET MIXED WITH WATER OR NON-CARBONATED DRINK POWDER, FOR SUSPENSION ORAL
Qty: 60 | Refills: 3 | Status: ACTIVE | COMMUNITY
Start: 2024-01-30

## 2024-07-30 RX ORDER — TEDUGLUTIDE 5 MG
AS DIRECTED KIT SUBCUTANEOUS
Status: ACTIVE | COMMUNITY

## 2024-07-30 RX ORDER — MULTIVIT-MIN/FOLIC/VIT K/LYCOP 400-300MCG
AS DIRECTED TABLET ORAL ONCE A DAY
Status: ACTIVE | COMMUNITY

## 2024-07-30 RX ORDER — VEDOLIZUMAB 300 MG/5ML
AS DIRECTED INJECTION, POWDER, LYOPHILIZED, FOR SOLUTION INTRAVENOUS
Qty: 2 | Refills: 5 | Status: ACTIVE | COMMUNITY
Start: 2024-06-21 | End: 2025-05-11

## 2024-07-30 RX ORDER — VEDOLIZUMAB 300 MG/5ML
AS DIRECTED INJECTION, POWDER, LYOPHILIZED, FOR SOLUTION INTRAVENOUS
Qty: 3 | Refills: 3 | Status: ACTIVE | COMMUNITY
Start: 2024-01-30 | End: 2025-02-04

## 2024-07-30 RX ORDER — VEDOLIZUMAB 300 MG/5ML
AS DIRECTED INJECTION, POWDER, LYOPHILIZED, FOR SOLUTION INTRAVENOUS
Status: ACTIVE | COMMUNITY
Start: 2024-06-21

## 2024-07-30 RX ORDER — CETIRIZINE HYDROCHLORIDE 10 MG/1
1 TABLET TABLET, FILM COATED ORAL ONCE A DAY
Status: ACTIVE | COMMUNITY

## 2024-07-30 RX ORDER — VENLAFAXINE 75 MG/1
1 TABLET WITH FOOD TABLET ORAL ONCE A DAY
Qty: 90 TABLET | Refills: 0 | Status: ACTIVE | COMMUNITY

## 2024-07-30 RX ORDER — ONDANSETRON 4 MG/1
1 TABLET ON THE TONGUE AND ALLOW TO DISSOLVE TABLET, ORALLY DISINTEGRATING ORAL ONCE A DAY
Qty: 30 | Refills: 3 | Status: ACTIVE | COMMUNITY
Start: 2024-06-21

## 2024-07-30 RX ORDER — CHOLESTYRAMINE 4 G/9G
1 PACKET MIXED WITH WATER OR NON-CARBONATED DRINK POWDER, FOR SUSPENSION ORAL
Qty: 120 | Refills: 2 | Status: ACTIVE | COMMUNITY
Start: 2022-11-29

## 2024-07-30 RX ORDER — CYANOCOBALAMIN 1000 UG/ML
1 ML INJECTION INTRAMUSCULAR; SUBCUTANEOUS ONCE A DAY
Status: ACTIVE | COMMUNITY

## 2024-07-30 RX ORDER — DIPHENOXYLATE HYDROCHLORIDE AND ATROPINE SULFATE 2.5; .025 MG/1; MG/1
1 TABLET AS NEEDED TABLET ORAL
Qty: 112 TABLET | Refills: 3 | Status: ACTIVE | COMMUNITY
Start: 2024-06-21 | End: 2024-10-11

## 2024-08-07 ENCOUNTER — TELEPHONE ENCOUNTER (OUTPATIENT)
Dept: URBAN - METROPOLITAN AREA CLINIC 46 | Facility: CLINIC | Age: 39
End: 2024-08-07

## 2024-08-07 RX ORDER — VEDOLIZUMAB 300 MG/5ML
AS DIRECTED INJECTION, POWDER, LYOPHILIZED, FOR SOLUTION INTRAVENOUS
Qty: 2 | Refills: 5
Start: 2024-06-21 | End: 2025-06-27

## 2024-08-12 ENCOUNTER — OFFICE VISIT (OUTPATIENT)
Dept: URBAN - METROPOLITAN AREA SURGERY CENTER 28 | Facility: SURGERY CENTER | Age: 39
End: 2024-08-12

## 2024-08-13 ENCOUNTER — OFFICE VISIT (OUTPATIENT)
Dept: URBAN - METROPOLITAN AREA CLINIC 43 | Facility: CLINIC | Age: 39
End: 2024-08-13

## 2024-08-20 ENCOUNTER — OFFICE VISIT (OUTPATIENT)
Dept: URBAN - METROPOLITAN AREA CLINIC 43 | Facility: CLINIC | Age: 39
End: 2024-08-20
Payer: COMMERCIAL

## 2024-08-20 ENCOUNTER — TELEPHONE ENCOUNTER (OUTPATIENT)
Dept: URBAN - METROPOLITAN AREA CLINIC 46 | Facility: CLINIC | Age: 39
End: 2024-08-20

## 2024-08-20 VITALS
BODY MASS INDEX: 22.58 KG/M2 | DIASTOLIC BLOOD PRESSURE: 83 MMHG | HEIGHT: 73 IN | RESPIRATION RATE: 17 BRPM | SYSTOLIC BLOOD PRESSURE: 136 MMHG | HEART RATE: 88 BPM | TEMPERATURE: 99 F | WEIGHT: 170.4 LBS

## 2024-08-20 DIAGNOSIS — K50.80 CROHN'S COLITIS: ICD-10-CM

## 2024-08-20 PROCEDURE — 96413 CHEMO IV INFUSION 1 HR: CPT | Performed by: INTERNAL MEDICINE

## 2024-08-20 RX ORDER — VEDOLIZUMAB 300 MG/5ML
AS DIRECTED INJECTION, POWDER, LYOPHILIZED, FOR SOLUTION INTRAVENOUS
Qty: 3 | Refills: 3 | Status: ACTIVE | COMMUNITY
Start: 2024-01-30 | End: 2025-02-04

## 2024-08-20 RX ORDER — ONDANSETRON 4 MG/1
1 TABLET ON THE TONGUE AND ALLOW TO DISSOLVE TABLET, ORALLY DISINTEGRATING ORAL ONCE A DAY
Qty: 30 | Refills: 3 | Status: ACTIVE | COMMUNITY
Start: 2024-06-21

## 2024-08-20 RX ORDER — CHOLESTYRAMINE 4 G/9G
1 PACKET MIXED WITH WATER OR NON-CARBONATED DRINK POWDER, FOR SUSPENSION ORAL
Qty: 60 | Refills: 3 | Status: ACTIVE | COMMUNITY
Start: 2024-01-30

## 2024-08-20 RX ORDER — MULTIVIT-MIN/FOLIC/VIT K/LYCOP 400-300MCG
AS DIRECTED TABLET ORAL ONCE A DAY
Status: ACTIVE | COMMUNITY

## 2024-08-20 RX ORDER — VENLAFAXINE 75 MG/1
1 TABLET WITH FOOD TABLET ORAL ONCE A DAY
Qty: 90 TABLET | Refills: 0 | Status: ACTIVE | COMMUNITY

## 2024-08-20 RX ORDER — CHOLECALCIFEROL TAB 50 MCG (2000 UNIT) 50 MCG
ONCE PER WEEK TAB ORAL
Status: ACTIVE | COMMUNITY

## 2024-08-20 RX ORDER — VEDOLIZUMAB 300 MG/5ML
AS DIRECTED INJECTION, POWDER, LYOPHILIZED, FOR SOLUTION INTRAVENOUS
Status: ACTIVE | COMMUNITY

## 2024-08-20 RX ORDER — CYANOCOBALAMIN 1000 UG/ML
1 ML INJECTION INTRAMUSCULAR; SUBCUTANEOUS ONCE A DAY
Status: ACTIVE | COMMUNITY

## 2024-08-20 RX ORDER — TEDUGLUTIDE 5 MG
AS DIRECTED KIT SUBCUTANEOUS
Status: ACTIVE | COMMUNITY

## 2024-08-20 RX ORDER — DIPHENOXYLATE HYDROCHLORIDE AND ATROPINE SULFATE 2.5; .025 MG/1; MG/1
1 TABLET AS NEEDED TABLET ORAL
Qty: 112 TABLET | Refills: 3 | Status: ACTIVE | COMMUNITY
Start: 2024-06-21 | End: 2024-10-11

## 2024-08-20 RX ORDER — CHOLESTYRAMINE 4 G/9G
1 PACKET MIXED WITH WATER OR NON-CARBONATED DRINK POWDER, FOR SUSPENSION ORAL
Qty: 120 | Refills: 2 | Status: ACTIVE | COMMUNITY
Start: 2022-11-29

## 2024-08-20 RX ORDER — DIPHENOXYLATE HYDROCHLORIDE AND ATROPINE SULFATE 2.5; .025 MG/1; MG/1
1 TABLET AS NEEDED TABLET ORAL
Status: ACTIVE | COMMUNITY

## 2024-08-20 RX ORDER — VEDOLIZUMAB 300 MG/5ML
AS DIRECTED INJECTION, POWDER, LYOPHILIZED, FOR SOLUTION INTRAVENOUS
Qty: 2 | Refills: 5 | Status: ACTIVE | COMMUNITY
Start: 2024-06-21 | End: 2025-06-27

## 2024-08-20 RX ORDER — CHOLESTYRAMINE 4 G/9G
1 PACKET MIXED WITH WATER OR NON-CARBONATED DRINK POWDER, FOR SUSPENSION ORAL ONCE A DAY
Qty: 30 | Refills: 3 | Status: ACTIVE | COMMUNITY
Start: 2024-06-21

## 2024-08-20 RX ORDER — POTASSIUM CHLORIDE 1500 MG/1
1 TABLET WITH FOOD TABLET, EXTENDED RELEASE ORAL ONCE A DAY
Qty: 90 TABLET | Status: ACTIVE | COMMUNITY

## 2024-08-20 RX ORDER — VEDOLIZUMAB 300 MG/5ML
AS DIRECTED INJECTION, POWDER, LYOPHILIZED, FOR SOLUTION INTRAVENOUS
Status: ACTIVE | COMMUNITY
Start: 2024-06-21

## 2024-08-20 RX ORDER — CETIRIZINE HYDROCHLORIDE 10 MG/1
1 TABLET TABLET, FILM COATED ORAL ONCE A DAY
Status: ACTIVE | COMMUNITY

## 2024-09-10 ENCOUNTER — TELEPHONE ENCOUNTER (OUTPATIENT)
Dept: URBAN - METROPOLITAN AREA CLINIC 46 | Facility: CLINIC | Age: 39
End: 2024-09-10

## 2024-09-24 ENCOUNTER — OFFICE VISIT (OUTPATIENT)
Dept: URBAN - METROPOLITAN AREA CLINIC 43 | Facility: CLINIC | Age: 39
End: 2024-09-24

## 2024-09-27 ENCOUNTER — TELEPHONE ENCOUNTER (OUTPATIENT)
Dept: URBAN - METROPOLITAN AREA CLINIC 46 | Facility: CLINIC | Age: 39
End: 2024-09-27

## 2024-10-01 ENCOUNTER — TELEPHONE ENCOUNTER (OUTPATIENT)
Dept: URBAN - METROPOLITAN AREA CLINIC 46 | Facility: CLINIC | Age: 39
End: 2024-10-01

## 2024-10-01 ENCOUNTER — OFFICE VISIT (OUTPATIENT)
Dept: URBAN - METROPOLITAN AREA CLINIC 43 | Facility: CLINIC | Age: 39
End: 2024-10-01

## 2024-10-08 ENCOUNTER — OFFICE VISIT (OUTPATIENT)
Dept: URBAN - METROPOLITAN AREA CLINIC 43 | Facility: CLINIC | Age: 39
End: 2024-10-08
Payer: COMMERCIAL

## 2024-10-08 VITALS
WEIGHT: 168 LBS | BODY MASS INDEX: 22.26 KG/M2 | DIASTOLIC BLOOD PRESSURE: 76 MMHG | TEMPERATURE: 97.9 F | RESPIRATION RATE: 19 BRPM | HEIGHT: 73 IN | SYSTOLIC BLOOD PRESSURE: 117 MMHG | HEART RATE: 72 BPM

## 2024-10-08 DIAGNOSIS — K50.90 CROHNS DISEASE: ICD-10-CM

## 2024-10-08 PROCEDURE — 96413 CHEMO IV INFUSION 1 HR: CPT | Performed by: INTERNAL MEDICINE

## 2024-10-08 RX ORDER — MULTIVIT-MIN/FOLIC/VIT K/LYCOP 400-300MCG
AS DIRECTED TABLET ORAL ONCE A DAY
Status: ACTIVE | COMMUNITY

## 2024-10-08 RX ORDER — VENLAFAXINE 75 MG/1
1 TABLET WITH FOOD TABLET ORAL ONCE A DAY
Qty: 90 TABLET | Refills: 0 | Status: ACTIVE | COMMUNITY

## 2024-10-08 RX ORDER — CYANOCOBALAMIN 1000 UG/ML
1 ML INJECTION INTRAMUSCULAR; SUBCUTANEOUS ONCE A DAY
Status: ACTIVE | COMMUNITY

## 2024-10-08 RX ORDER — ONDANSETRON 4 MG/1
1 TABLET ON THE TONGUE AND ALLOW TO DISSOLVE TABLET, ORALLY DISINTEGRATING ORAL ONCE A DAY
Qty: 30 | Refills: 3 | Status: ACTIVE | COMMUNITY
Start: 2024-06-21

## 2024-10-08 RX ORDER — VEDOLIZUMAB 300 MG/5ML
AS DIRECTED INJECTION, POWDER, LYOPHILIZED, FOR SOLUTION INTRAVENOUS
Qty: 3 | Refills: 3 | Status: ACTIVE | COMMUNITY
Start: 2024-01-30 | End: 2025-02-04

## 2024-10-08 RX ORDER — CHOLECALCIFEROL TAB 50 MCG (2000 UNIT) 50 MCG
ONCE PER WEEK TAB ORAL
Status: ACTIVE | COMMUNITY

## 2024-10-08 RX ORDER — VEDOLIZUMAB 300 MG/5ML
AS DIRECTED INJECTION, POWDER, LYOPHILIZED, FOR SOLUTION INTRAVENOUS
Status: ACTIVE | COMMUNITY
Start: 2024-06-21

## 2024-10-08 RX ORDER — CHOLESTYRAMINE 4 G/9G
1 PACKET MIXED WITH WATER OR NON-CARBONATED DRINK POWDER, FOR SUSPENSION ORAL ONCE A DAY
Qty: 30 | Refills: 3 | Status: ACTIVE | COMMUNITY
Start: 2024-06-21

## 2024-10-08 RX ORDER — DIPHENOXYLATE HYDROCHLORIDE AND ATROPINE SULFATE 2.5; .025 MG/1; MG/1
1 TABLET AS NEEDED TABLET ORAL
Status: ACTIVE | COMMUNITY

## 2024-10-08 RX ORDER — DIPHENOXYLATE HYDROCHLORIDE AND ATROPINE SULFATE 2.5; .025 MG/1; MG/1
1 TABLET AS NEEDED TABLET ORAL
Qty: 112 TABLET | Refills: 3 | Status: ACTIVE | COMMUNITY
Start: 2024-06-21 | End: 2024-10-11

## 2024-10-08 RX ORDER — POTASSIUM CHLORIDE 1500 MG/1
1 TABLET WITH FOOD TABLET, EXTENDED RELEASE ORAL ONCE A DAY
Qty: 90 TABLET | Status: ACTIVE | COMMUNITY

## 2024-10-08 RX ORDER — TEDUGLUTIDE 5 MG
AS DIRECTED KIT SUBCUTANEOUS
Status: ACTIVE | COMMUNITY

## 2024-10-08 RX ORDER — CHOLESTYRAMINE 4 G/9G
1 PACKET MIXED WITH WATER OR NON-CARBONATED DRINK POWDER, FOR SUSPENSION ORAL
Qty: 60 | Refills: 3 | Status: ACTIVE | COMMUNITY
Start: 2024-01-30

## 2024-10-08 RX ORDER — CETIRIZINE HYDROCHLORIDE 10 MG/1
1 TABLET TABLET, FILM COATED ORAL ONCE A DAY
Status: ACTIVE | COMMUNITY

## 2024-10-08 RX ORDER — VEDOLIZUMAB 300 MG/5ML
AS DIRECTED INJECTION, POWDER, LYOPHILIZED, FOR SOLUTION INTRAVENOUS
Status: ACTIVE | COMMUNITY

## 2024-10-08 RX ORDER — CHOLESTYRAMINE 4 G/9G
1 PACKET MIXED WITH WATER OR NON-CARBONATED DRINK POWDER, FOR SUSPENSION ORAL
Qty: 120 | Refills: 2 | Status: ACTIVE | COMMUNITY
Start: 2022-11-29

## 2024-10-08 RX ORDER — VEDOLIZUMAB 300 MG/5ML
AS DIRECTED INJECTION, POWDER, LYOPHILIZED, FOR SOLUTION INTRAVENOUS
Qty: 2 | Refills: 5 | Status: ACTIVE | COMMUNITY
Start: 2024-06-21 | End: 2025-06-27

## 2024-10-16 ENCOUNTER — TELEPHONE ENCOUNTER (OUTPATIENT)
Dept: URBAN - METROPOLITAN AREA CLINIC 46 | Facility: CLINIC | Age: 39
End: 2024-10-16

## 2024-12-03 ENCOUNTER — OFFICE VISIT (OUTPATIENT)
Dept: URBAN - METROPOLITAN AREA CLINIC 43 | Facility: CLINIC | Age: 39
End: 2024-12-03
Payer: COMMERCIAL

## 2024-12-03 VITALS
HEART RATE: 61 BPM | TEMPERATURE: 97.6 F | HEIGHT: 73 IN | WEIGHT: 168 LBS | BODY MASS INDEX: 22.26 KG/M2 | DIASTOLIC BLOOD PRESSURE: 84 MMHG | RESPIRATION RATE: 19 BRPM | SYSTOLIC BLOOD PRESSURE: 124 MMHG

## 2024-12-03 DIAGNOSIS — K50.10 CROHN'S DISEASE OF COLON: ICD-10-CM

## 2024-12-03 PROCEDURE — 96413 CHEMO IV INFUSION 1 HR: CPT | Performed by: INTERNAL MEDICINE

## 2024-12-03 RX ORDER — CHOLESTYRAMINE 4 G/9G
1 PACKET MIXED WITH WATER OR NON-CARBONATED DRINK POWDER, FOR SUSPENSION ORAL
Qty: 120 | Refills: 2 | Status: ACTIVE | COMMUNITY
Start: 2022-11-29

## 2024-12-03 RX ORDER — CHOLECALCIFEROL TAB 50 MCG (2000 UNIT) 50 MCG
ONCE PER WEEK TAB ORAL
Status: ACTIVE | COMMUNITY

## 2024-12-03 RX ORDER — POTASSIUM CHLORIDE 1500 MG/1
1 TABLET WITH FOOD TABLET, EXTENDED RELEASE ORAL ONCE A DAY
Qty: 90 TABLET | Status: ACTIVE | COMMUNITY

## 2024-12-03 RX ORDER — VEDOLIZUMAB 300 MG/5ML
AS DIRECTED INJECTION, POWDER, LYOPHILIZED, FOR SOLUTION INTRAVENOUS
Qty: 2 | Refills: 5 | Status: ACTIVE | COMMUNITY
Start: 2024-06-21 | End: 2025-06-27

## 2024-12-03 RX ORDER — DIPHENOXYLATE HYDROCHLORIDE AND ATROPINE SULFATE 2.5; .025 MG/1; MG/1
1 TABLET AS NEEDED TABLET ORAL
Status: ACTIVE | COMMUNITY

## 2024-12-03 RX ORDER — TEDUGLUTIDE 5 MG
AS DIRECTED KIT SUBCUTANEOUS
Status: ACTIVE | COMMUNITY

## 2024-12-03 RX ORDER — CETIRIZINE HYDROCHLORIDE 10 MG/1
1 TABLET TABLET, FILM COATED ORAL ONCE A DAY
Status: ACTIVE | COMMUNITY

## 2024-12-03 RX ORDER — ONDANSETRON 4 MG/1
1 TABLET ON THE TONGUE AND ALLOW TO DISSOLVE TABLET, ORALLY DISINTEGRATING ORAL ONCE A DAY
Qty: 30 | Refills: 3 | Status: ACTIVE | COMMUNITY
Start: 2024-06-21

## 2024-12-03 RX ORDER — CHOLESTYRAMINE 4 G/9G
1 PACKET MIXED WITH WATER OR NON-CARBONATED DRINK POWDER, FOR SUSPENSION ORAL
Qty: 60 | Refills: 3 | Status: ACTIVE | COMMUNITY
Start: 2024-01-30

## 2024-12-03 RX ORDER — VENLAFAXINE 75 MG/1
1 TABLET WITH FOOD TABLET ORAL ONCE A DAY
Qty: 90 TABLET | Refills: 0 | Status: ACTIVE | COMMUNITY

## 2024-12-03 RX ORDER — CHOLESTYRAMINE 4 G/9G
1 PACKET MIXED WITH WATER OR NON-CARBONATED DRINK POWDER, FOR SUSPENSION ORAL ONCE A DAY
Qty: 30 | Refills: 3 | Status: ACTIVE | COMMUNITY
Start: 2024-06-21

## 2024-12-03 RX ORDER — VEDOLIZUMAB 300 MG/5ML
AS DIRECTED INJECTION, POWDER, LYOPHILIZED, FOR SOLUTION INTRAVENOUS
Status: ACTIVE | COMMUNITY
Start: 2024-06-21

## 2024-12-03 RX ORDER — CYANOCOBALAMIN 1000 UG/ML
1 ML INJECTION INTRAMUSCULAR; SUBCUTANEOUS ONCE A DAY
Status: ACTIVE | COMMUNITY

## 2024-12-03 RX ORDER — MULTIVIT-MIN/FOLIC/VIT K/LYCOP 400-300MCG
AS DIRECTED TABLET ORAL ONCE A DAY
Status: ACTIVE | COMMUNITY

## 2024-12-03 RX ORDER — VEDOLIZUMAB 300 MG/5ML
AS DIRECTED INJECTION, POWDER, LYOPHILIZED, FOR SOLUTION INTRAVENOUS
Qty: 3 | Refills: 3 | Status: ACTIVE | COMMUNITY
Start: 2024-01-30 | End: 2025-02-04

## 2024-12-03 RX ORDER — VEDOLIZUMAB 300 MG/5ML
AS DIRECTED INJECTION, POWDER, LYOPHILIZED, FOR SOLUTION INTRAVENOUS
Status: ACTIVE | COMMUNITY

## 2025-01-28 ENCOUNTER — OFFICE VISIT (OUTPATIENT)
Dept: URBAN - METROPOLITAN AREA CLINIC 43 | Facility: CLINIC | Age: 40
End: 2025-01-28

## 2025-02-04 ENCOUNTER — OFFICE VISIT (OUTPATIENT)
Dept: URBAN - METROPOLITAN AREA CLINIC 43 | Facility: CLINIC | Age: 40
End: 2025-02-04
Payer: COMMERCIAL

## 2025-02-04 VITALS
WEIGHT: 167 LBS | SYSTOLIC BLOOD PRESSURE: 123 MMHG | DIASTOLIC BLOOD PRESSURE: 81 MMHG | BODY MASS INDEX: 22.13 KG/M2 | HEIGHT: 73 IN | RESPIRATION RATE: 18 BRPM | TEMPERATURE: 97.7 F | HEART RATE: 78 BPM

## 2025-02-04 DIAGNOSIS — K50.10 CROHN'S DISEASE OF COLON: ICD-10-CM

## 2025-02-04 PROCEDURE — 96413 CHEMO IV INFUSION 1 HR: CPT | Performed by: INTERNAL MEDICINE

## 2025-02-04 RX ORDER — MULTIVIT-MIN/FOLIC/VIT K/LYCOP 400-300MCG
AS DIRECTED TABLET ORAL ONCE A DAY
Status: ACTIVE | COMMUNITY

## 2025-02-04 RX ORDER — TEDUGLUTIDE 5 MG
AS DIRECTED KIT SUBCUTANEOUS
Status: ACTIVE | COMMUNITY

## 2025-02-04 RX ORDER — CYANOCOBALAMIN 1000 UG/ML
1 ML INJECTION INTRAMUSCULAR; SUBCUTANEOUS ONCE A DAY
Status: ACTIVE | COMMUNITY

## 2025-02-04 RX ORDER — CHOLECALCIFEROL TAB 50 MCG (2000 UNIT) 50 MCG
ONCE PER WEEK TAB ORAL
Status: ACTIVE | COMMUNITY

## 2025-02-04 RX ORDER — VEDOLIZUMAB 300 MG/5ML
AS DIRECTED INJECTION, POWDER, LYOPHILIZED, FOR SOLUTION INTRAVENOUS
Qty: 2 | Refills: 5 | Status: ACTIVE | COMMUNITY
Start: 2024-06-21 | End: 2025-06-27

## 2025-02-04 RX ORDER — ONDANSETRON 4 MG/1
1 TABLET ON THE TONGUE AND ALLOW TO DISSOLVE TABLET, ORALLY DISINTEGRATING ORAL ONCE A DAY
Qty: 30 | Refills: 3 | Status: ACTIVE | COMMUNITY
Start: 2024-06-21

## 2025-02-04 RX ORDER — POTASSIUM CHLORIDE 1500 MG/1
1 TABLET WITH FOOD TABLET, EXTENDED RELEASE ORAL ONCE A DAY
Qty: 90 TABLET | Status: ACTIVE | COMMUNITY

## 2025-02-04 RX ORDER — CETIRIZINE HYDROCHLORIDE 10 MG/1
1 TABLET TABLET, FILM COATED ORAL ONCE A DAY
Status: ACTIVE | COMMUNITY

## 2025-02-04 RX ORDER — CHOLESTYRAMINE 4 G/9G
1 PACKET MIXED WITH WATER OR NON-CARBONATED DRINK POWDER, FOR SUSPENSION ORAL
Qty: 120 | Refills: 2 | Status: ACTIVE | COMMUNITY
Start: 2022-11-29

## 2025-02-04 RX ORDER — VEDOLIZUMAB 300 MG/5ML
AS DIRECTED INJECTION, POWDER, LYOPHILIZED, FOR SOLUTION INTRAVENOUS
Status: ACTIVE | COMMUNITY

## 2025-02-04 RX ORDER — VENLAFAXINE 75 MG/1
1 TABLET WITH FOOD TABLET ORAL ONCE A DAY
Qty: 90 TABLET | Refills: 0 | Status: ACTIVE | COMMUNITY

## 2025-02-04 RX ORDER — DIPHENOXYLATE HYDROCHLORIDE AND ATROPINE SULFATE 2.5; .025 MG/1; MG/1
1 TABLET AS NEEDED TABLET ORAL
Status: ACTIVE | COMMUNITY

## 2025-02-04 RX ORDER — VEDOLIZUMAB 300 MG/5ML
AS DIRECTED INJECTION, POWDER, LYOPHILIZED, FOR SOLUTION INTRAVENOUS
Qty: 3 | Refills: 3 | Status: ACTIVE | COMMUNITY
Start: 2024-01-30 | End: 2025-02-04

## 2025-02-04 RX ORDER — CHOLESTYRAMINE 4 G/9G
1 PACKET MIXED WITH WATER OR NON-CARBONATED DRINK POWDER, FOR SUSPENSION ORAL ONCE A DAY
Qty: 30 | Refills: 3 | Status: ACTIVE | COMMUNITY
Start: 2024-06-21

## 2025-02-04 RX ORDER — VEDOLIZUMAB 300 MG/5ML
AS DIRECTED INJECTION, POWDER, LYOPHILIZED, FOR SOLUTION INTRAVENOUS
Status: ACTIVE | COMMUNITY
Start: 2024-06-21

## 2025-02-04 RX ORDER — CHOLESTYRAMINE 4 G/9G
1 PACKET MIXED WITH WATER OR NON-CARBONATED DRINK POWDER, FOR SUSPENSION ORAL
Qty: 60 | Refills: 3 | Status: ACTIVE | COMMUNITY
Start: 2024-01-30

## 2025-02-23 ENCOUNTER — CLAIMS CREATED FROM THE CLAIM WINDOW (OUTPATIENT)
Dept: URBAN - METROPOLITAN AREA MEDICAL CENTER 35 | Facility: MEDICAL CENTER | Age: 40
End: 2025-02-23
Payer: COMMERCIAL

## 2025-02-23 DIAGNOSIS — E44.0 MODERATE PROTEIN-CALORIE MALNUTRITION: ICD-10-CM

## 2025-02-23 DIAGNOSIS — K50.90 CROHN'S DISEASE, UNSPECIFIED, WITHOUT COMPLICATIONS: ICD-10-CM

## 2025-02-23 DIAGNOSIS — K75.4 AUTOIMMUNE HEPATITIS: ICD-10-CM

## 2025-02-23 DIAGNOSIS — R57.1 HYPOVOLEMIC SHOCK: ICD-10-CM

## 2025-02-23 PROCEDURE — 99254 IP/OBS CNSLTJ NEW/EST MOD 60: CPT | Performed by: INTERNAL MEDICINE

## 2025-04-01 ENCOUNTER — OFFICE VISIT (OUTPATIENT)
Dept: URBAN - METROPOLITAN AREA CLINIC 43 | Facility: CLINIC | Age: 40
End: 2025-04-01

## 2025-04-03 ENCOUNTER — WEB ENCOUNTER (OUTPATIENT)
Dept: URBAN - METROPOLITAN AREA CLINIC 44 | Facility: CLINIC | Age: 40
End: 2025-04-03

## 2025-04-03 RX ORDER — TEDUGLUTIDE 5 MG: AS DIRECTED KIT SUBCUTANEOUS

## 2025-04-04 ENCOUNTER — TELEPHONE ENCOUNTER (OUTPATIENT)
Dept: URBAN - METROPOLITAN AREA CLINIC 44 | Facility: CLINIC | Age: 40
End: 2025-04-04

## 2025-04-08 ENCOUNTER — OFFICE VISIT (OUTPATIENT)
Dept: URBAN - METROPOLITAN AREA CLINIC 43 | Facility: CLINIC | Age: 40
End: 2025-04-08

## 2025-04-10 ENCOUNTER — TELEPHONE ENCOUNTER (OUTPATIENT)
Dept: URBAN - METROPOLITAN AREA CLINIC 46 | Facility: CLINIC | Age: 40
End: 2025-04-10

## 2025-04-10 ENCOUNTER — WEB ENCOUNTER (OUTPATIENT)
Dept: URBAN - METROPOLITAN AREA CLINIC 44 | Facility: CLINIC | Age: 40
End: 2025-04-10

## 2025-04-11 ENCOUNTER — OFFICE VISIT (OUTPATIENT)
Dept: URBAN - METROPOLITAN AREA TELEHEALTH 2 | Facility: TELEHEALTH | Age: 40
End: 2025-04-11

## 2025-04-11 RX ORDER — DIPHENOXYLATE HYDROCHLORIDE AND ATROPINE SULFATE 2.5; .025 MG/1; MG/1
1 TABLET AS NEEDED TABLET ORAL
Status: ACTIVE | COMMUNITY

## 2025-04-11 RX ORDER — VEDOLIZUMAB 300 MG/5ML
AS DIRECTED INJECTION, POWDER, LYOPHILIZED, FOR SOLUTION INTRAVENOUS
Qty: 2 | Refills: 5 | Status: ACTIVE | COMMUNITY
Start: 2024-06-21 | End: 2025-06-27

## 2025-04-11 RX ORDER — VEDOLIZUMAB 300 MG/5ML
AS DIRECTED INJECTION, POWDER, LYOPHILIZED, FOR SOLUTION INTRAVENOUS
Status: ACTIVE | COMMUNITY

## 2025-04-11 RX ORDER — VEDOLIZUMAB 300 MG/5ML
AS DIRECTED INJECTION, POWDER, LYOPHILIZED, FOR SOLUTION INTRAVENOUS
Status: ACTIVE | COMMUNITY
Start: 2024-06-21

## 2025-04-11 RX ORDER — CYANOCOBALAMIN 1000 UG/ML
1 ML INJECTION INTRAMUSCULAR; SUBCUTANEOUS ONCE A DAY
Status: ACTIVE | COMMUNITY

## 2025-04-11 RX ORDER — TEDUGLUTIDE 5 MG
AS DIRECTED KIT SUBCUTANEOUS
Status: ACTIVE | COMMUNITY

## 2025-04-11 RX ORDER — VENLAFAXINE 75 MG/1
1 TABLET WITH FOOD TABLET ORAL ONCE A DAY
Qty: 90 TABLET | Refills: 0 | Status: ACTIVE | COMMUNITY

## 2025-04-11 RX ORDER — CETIRIZINE HYDROCHLORIDE 10 MG/1
1 TABLET TABLET, FILM COATED ORAL ONCE A DAY
Status: ACTIVE | COMMUNITY

## 2025-04-11 RX ORDER — ONDANSETRON 4 MG/1
1 TABLET ON THE TONGUE AND ALLOW TO DISSOLVE TABLET, ORALLY DISINTEGRATING ORAL ONCE A DAY
Qty: 30 | Refills: 3 | Status: ACTIVE | COMMUNITY
Start: 2024-06-21

## 2025-04-11 RX ORDER — POTASSIUM CHLORIDE 1500 MG/1
1 TABLET WITH FOOD TABLET, EXTENDED RELEASE ORAL ONCE A DAY
Qty: 90 TABLET | Status: ACTIVE | COMMUNITY

## 2025-04-11 RX ORDER — CHOLESTYRAMINE 4 G/9G
1 PACKET MIXED WITH WATER OR NON-CARBONATED DRINK POWDER, FOR SUSPENSION ORAL
Qty: 60 | Refills: 3 | Status: ACTIVE | COMMUNITY
Start: 2024-01-30

## 2025-04-11 RX ORDER — CHOLECALCIFEROL TAB 50 MCG (2000 UNIT) 50 MCG
ONCE PER WEEK TAB ORAL
Status: ACTIVE | COMMUNITY

## 2025-04-11 RX ORDER — MULTIVIT-MIN/FOLIC/VIT K/LYCOP 400-300MCG
AS DIRECTED TABLET ORAL ONCE A DAY
Status: ACTIVE | COMMUNITY

## 2025-04-17 ENCOUNTER — OFFICE VISIT (OUTPATIENT)
Dept: URBAN - METROPOLITAN AREA CLINIC 117 | Facility: CLINIC | Age: 40
End: 2025-04-17

## 2025-04-22 ENCOUNTER — OFFICE VISIT (OUTPATIENT)
Dept: URBAN - METROPOLITAN AREA CLINIC 43 | Facility: CLINIC | Age: 40
End: 2025-04-22
Payer: COMMERCIAL

## 2025-04-22 DIAGNOSIS — K50.90 CROHNS DISEASE: ICD-10-CM

## 2025-04-22 PROCEDURE — 96413 CHEMO IV INFUSION 1 HR: CPT | Performed by: INTERNAL MEDICINE

## 2025-04-22 RX ORDER — DIPHENOXYLATE HYDROCHLORIDE AND ATROPINE SULFATE 2.5; .025 MG/1; MG/1
1 TABLET AS NEEDED TABLET ORAL
Status: ACTIVE | COMMUNITY

## 2025-04-22 RX ORDER — CHOLESTYRAMINE 4 G/9G
1 PACKET MIXED WITH WATER OR NON-CARBONATED DRINK POWDER, FOR SUSPENSION ORAL
Qty: 120 | Refills: 2 | Status: ACTIVE | COMMUNITY
Start: 2022-11-29

## 2025-04-22 RX ORDER — VEDOLIZUMAB 300 MG/5ML
AS DIRECTED INJECTION, POWDER, LYOPHILIZED, FOR SOLUTION INTRAVENOUS
Qty: 2 | Refills: 5 | Status: ACTIVE | COMMUNITY
Start: 2024-06-21 | End: 2025-06-27

## 2025-04-22 RX ORDER — POTASSIUM CHLORIDE 1500 MG/1
1 TABLET WITH FOOD TABLET, EXTENDED RELEASE ORAL ONCE A DAY
Qty: 90 TABLET | Status: ACTIVE | COMMUNITY

## 2025-04-22 RX ORDER — TEDUGLUTIDE 5 MG
AS DIRECTED KIT SUBCUTANEOUS
Status: ACTIVE | COMMUNITY

## 2025-04-22 RX ORDER — CHOLESTYRAMINE 4 G/9G
1 PACKET MIXED WITH WATER OR NON-CARBONATED DRINK POWDER, FOR SUSPENSION ORAL ONCE A DAY
Qty: 30 | Refills: 3 | Status: ACTIVE | COMMUNITY
Start: 2024-06-21

## 2025-04-22 RX ORDER — VEDOLIZUMAB 300 MG/5ML
AS DIRECTED INJECTION, POWDER, LYOPHILIZED, FOR SOLUTION INTRAVENOUS
Status: ACTIVE | COMMUNITY

## 2025-04-22 RX ORDER — ONDANSETRON 4 MG/1
1 TABLET ON THE TONGUE AND ALLOW TO DISSOLVE TABLET, ORALLY DISINTEGRATING ORAL ONCE A DAY
Qty: 30 | Refills: 3 | Status: ACTIVE | COMMUNITY
Start: 2024-06-21

## 2025-04-22 RX ORDER — MULTIVIT-MIN/FOLIC/VIT K/LYCOP 400-300MCG
AS DIRECTED TABLET ORAL ONCE A DAY
Status: ACTIVE | COMMUNITY

## 2025-04-22 RX ORDER — VEDOLIZUMAB 300 MG/5ML
AS DIRECTED INJECTION, POWDER, LYOPHILIZED, FOR SOLUTION INTRAVENOUS
Status: ACTIVE | COMMUNITY
Start: 2024-06-21

## 2025-04-22 RX ORDER — CHOLESTYRAMINE 4 G/9G
1 PACKET MIXED WITH WATER OR NON-CARBONATED DRINK POWDER, FOR SUSPENSION ORAL
Qty: 60 | Refills: 3 | Status: ACTIVE | COMMUNITY
Start: 2024-01-30

## 2025-04-22 RX ORDER — CHOLECALCIFEROL TAB 50 MCG (2000 UNIT) 50 MCG
ONCE PER WEEK TAB ORAL
Status: ACTIVE | COMMUNITY

## 2025-04-22 RX ORDER — VENLAFAXINE 75 MG/1
1 TABLET WITH FOOD TABLET ORAL ONCE A DAY
Qty: 90 TABLET | Refills: 0 | Status: ACTIVE | COMMUNITY

## 2025-04-22 RX ORDER — CETIRIZINE HYDROCHLORIDE 10 MG/1
1 TABLET TABLET, FILM COATED ORAL ONCE A DAY
Status: ACTIVE | COMMUNITY

## 2025-04-22 RX ORDER — CYANOCOBALAMIN 1000 UG/ML
1 ML INJECTION INTRAMUSCULAR; SUBCUTANEOUS ONCE A DAY
Status: ACTIVE | COMMUNITY

## 2025-05-06 ENCOUNTER — TELEPHONE ENCOUNTER (OUTPATIENT)
Dept: URBAN - METROPOLITAN AREA CLINIC 46 | Facility: CLINIC | Age: 40
End: 2025-05-06

## 2025-05-06 RX ORDER — TEDUGLUTIDE 5 MG: AS DIRECTED KIT SUBCUTANEOUS

## 2025-05-15 ENCOUNTER — TELEPHONE ENCOUNTER (OUTPATIENT)
Dept: URBAN - METROPOLITAN AREA CLINIC 44 | Facility: CLINIC | Age: 40
End: 2025-05-15

## 2025-05-21 ENCOUNTER — WEB ENCOUNTER (OUTPATIENT)
Dept: URBAN - METROPOLITAN AREA CLINIC 44 | Facility: CLINIC | Age: 40
End: 2025-05-21

## 2025-05-27 ENCOUNTER — TELEPHONE ENCOUNTER (OUTPATIENT)
Dept: URBAN - METROPOLITAN AREA CLINIC 44 | Facility: CLINIC | Age: 40
End: 2025-05-27

## 2025-05-30 ENCOUNTER — LAB OUTSIDE AN ENCOUNTER (OUTPATIENT)
Dept: URBAN - METROPOLITAN AREA MEDICAL CENTER 35 | Facility: MEDICAL CENTER | Age: 40
End: 2025-05-30

## 2025-06-02 ENCOUNTER — WEB ENCOUNTER (OUTPATIENT)
Dept: URBAN - METROPOLITAN AREA CLINIC 44 | Facility: CLINIC | Age: 40
End: 2025-06-02

## 2025-06-04 ENCOUNTER — OFFICE VISIT (OUTPATIENT)
Dept: URBAN - METROPOLITAN AREA TELEHEALTH 2 | Facility: TELEHEALTH | Age: 40
End: 2025-06-04
Payer: COMMERCIAL

## 2025-06-04 DIAGNOSIS — E80.6 HYPERBILIRUBINEMIA: ICD-10-CM

## 2025-06-04 DIAGNOSIS — R79.89 ELEVATED LFTS: ICD-10-CM

## 2025-06-04 DIAGNOSIS — K90.9 INTESTINAL MALABSORPTION, UNSPECIFIED: ICD-10-CM

## 2025-06-04 DIAGNOSIS — K50.90 CROHNS DISEASE: ICD-10-CM

## 2025-06-04 PROCEDURE — 99213 OFFICE O/P EST LOW 20 MIN: CPT | Performed by: INTERNAL MEDICINE

## 2025-06-04 RX ORDER — DIPHENOXYLATE HYDROCHLORIDE AND ATROPINE SULFATE 2.5; .025 MG/1; MG/1
1 TABLET AS NEEDED TABLET ORAL
Status: ACTIVE | COMMUNITY

## 2025-06-04 RX ORDER — CHOLECALCIFEROL TAB 50 MCG (2000 UNIT) 50 MCG
ONCE PER WEEK TAB ORAL
Status: ACTIVE | COMMUNITY

## 2025-06-04 RX ORDER — VEDOLIZUMAB 300 MG/5ML
AS DIRECTED INJECTION, POWDER, LYOPHILIZED, FOR SOLUTION INTRAVENOUS
Qty: 2 | Refills: 5 | Status: ACTIVE | COMMUNITY
Start: 2024-06-21 | End: 2025-06-27

## 2025-06-04 RX ORDER — VEDOLIZUMAB 300 MG/5ML
AS DIRECTED INJECTION, POWDER, LYOPHILIZED, FOR SOLUTION INTRAVENOUS
Status: ACTIVE | COMMUNITY

## 2025-06-04 RX ORDER — TEDUGLUTIDE 5 MG
AS DIRECTED KIT SUBCUTANEOUS
Status: ACTIVE | COMMUNITY

## 2025-06-04 RX ORDER — VENLAFAXINE 75 MG/1
1 TABLET WITH FOOD TABLET ORAL ONCE A DAY
Qty: 90 TABLET | Refills: 0 | Status: ACTIVE | COMMUNITY

## 2025-06-04 RX ORDER — POTASSIUM CHLORIDE 1500 MG/1
1 TABLET WITH FOOD TABLET, EXTENDED RELEASE ORAL ONCE A DAY
Qty: 90 TABLET | Status: ACTIVE | COMMUNITY

## 2025-06-04 RX ORDER — CYANOCOBALAMIN 1000 UG/ML
1 ML INJECTION INTRAMUSCULAR; SUBCUTANEOUS ONCE A DAY
Status: ACTIVE | COMMUNITY

## 2025-06-04 RX ORDER — CHOLESTYRAMINE 4 G/9G
1 PACKET MIXED WITH WATER OR NON-CARBONATED DRINK POWDER, FOR SUSPENSION ORAL ONCE A DAY
Qty: 30 | Refills: 3 | Status: ACTIVE | COMMUNITY
Start: 2024-06-21

## 2025-06-04 RX ORDER — CETIRIZINE HYDROCHLORIDE 10 MG/1
1 TABLET TABLET, FILM COATED ORAL ONCE A DAY
Status: ACTIVE | COMMUNITY

## 2025-06-04 RX ORDER — MULTIVIT-MIN/FOLIC/VIT K/LYCOP 400-300MCG
AS DIRECTED TABLET ORAL ONCE A DAY
Status: ACTIVE | COMMUNITY

## 2025-06-04 RX ORDER — ONDANSETRON 4 MG/1
1 TABLET ON THE TONGUE AND ALLOW TO DISSOLVE TABLET, ORALLY DISINTEGRATING ORAL ONCE A DAY
Qty: 30 | Refills: 3 | Status: ACTIVE | COMMUNITY
Start: 2024-06-21

## 2025-06-04 NOTE — HPI-TODAY'S VISIT:
39-year-old male presents for follow-up.  He has history of Crohn's disease, autoimmune hepatitis, and Gilbert's.  Crohn's disease x 18 years status post total colectomy with terminal ileum resection, ileostomy and rectal pouch.  He also has had 2 small bowel resections with 14 inches removed each time.  He has been on several medications in the past including Cimzia, Remicade, mesalamine, steroids.  History of noncompliance and has been lost to follow-up intermittently.  Patient has been to the hospital multiple times for dehydration secondary to diarrhea.  He has been Rx'd cholestyramine in the past.  Continues TPN via PICC line 2-3 times per week, IV fluids 2 times weekly.  Last year he started Gattex.  Currently on Entyvio IV every 8 weeks.  At last OV, started cholestyramine, and refilled Entyvio.  Last EGD 3/16/2022 showed nonerosive gastritis, consistent with portal hypertensive gastropathy.  Colonoscopy done at the same time showed rectal stricture, single ulcer and stricture in the area at 16 cm proximal to the stoma.  No specimens collected.  Recommended repeat colonoscopy to evaluate response to therapy.  Patient went to hospital 4/8/2024 for generalized weakness, found to have leukocytosis to 18,000. Patient noted abdominal pain, increased output from ostomy, and was ?overdue for Entyvio infusion. CT chest/abdomen/pelvis was unremarkable.  LFTs also elevated, with ALT to 207, AST to 116, alk phos to 155, tbili to 4.7.  Creatinine was elevated to 1.76.  Patient diagnosed with sepsis thought to potentially be due to PICC line infection and admitted.  Discharged 4/10/2024 on Lomotil.  6/4/25: Presents for follow up via telehealth. The patient is at baseline on Entycvio infusions and Gattex. He uses Cholestyramine prn. His weight has been steady. He is back to work. Denies blood or black stool per ostomy. 6/2/25 CBC  , CMP showed , AST 79, tbili 2.9, and alk phos 126. CBC is unremarkable. Liver is normal per 2/2205 CT. He has occasional abdominal cramping. His PCP office requested he get Vit D level checked at next lab draw at the time of infusion.  Stool studies done 5/28/2024 showed normal calpro 35.5. Last colon was in 2022 and showed an ulcer 15cm proximal to the stoma and a intrinsic sever stenosis measuring 2mm, and a rectal stricture.

## 2025-06-05 ENCOUNTER — TELEPHONE ENCOUNTER (OUTPATIENT)
Dept: URBAN - METROPOLITAN AREA CLINIC 46 | Facility: CLINIC | Age: 40
End: 2025-06-05

## 2025-06-13 ENCOUNTER — TELEPHONE ENCOUNTER (OUTPATIENT)
Dept: URBAN - METROPOLITAN AREA CLINIC 44 | Facility: CLINIC | Age: 40
End: 2025-06-13

## 2025-06-17 ENCOUNTER — OFFICE VISIT (OUTPATIENT)
Dept: URBAN - METROPOLITAN AREA CLINIC 43 | Facility: CLINIC | Age: 40
End: 2025-06-17

## 2025-06-24 ENCOUNTER — OFFICE VISIT (OUTPATIENT)
Dept: URBAN - METROPOLITAN AREA CLINIC 43 | Facility: CLINIC | Age: 40
End: 2025-06-24
Payer: COMMERCIAL

## 2025-06-24 DIAGNOSIS — K50.90 CROHN DISEASE: ICD-10-CM

## 2025-06-24 PROCEDURE — 96413 CHEMO IV INFUSION 1 HR: CPT | Performed by: INTERNAL MEDICINE

## 2025-06-24 RX ORDER — CHOLECALCIFEROL TAB 50 MCG (2000 UNIT) 50 MCG
ONCE PER WEEK TAB ORAL
Status: ACTIVE | COMMUNITY

## 2025-06-24 RX ORDER — TEDUGLUTIDE 5 MG
AS DIRECTED KIT SUBCUTANEOUS
Status: ACTIVE | COMMUNITY

## 2025-06-24 RX ORDER — ONDANSETRON 4 MG/1
1 TABLET ON THE TONGUE AND ALLOW TO DISSOLVE TABLET, ORALLY DISINTEGRATING ORAL ONCE A DAY
Qty: 30 | Refills: 3 | Status: ACTIVE | COMMUNITY
Start: 2024-06-21

## 2025-06-24 RX ORDER — MULTIVIT-MIN/FOLIC/VIT K/LYCOP 400-300MCG
AS DIRECTED TABLET ORAL ONCE A DAY
Status: ACTIVE | COMMUNITY

## 2025-06-24 RX ORDER — DIPHENOXYLATE HYDROCHLORIDE AND ATROPINE SULFATE 2.5; .025 MG/1; MG/1
1 TABLET AS NEEDED TABLET ORAL
Status: ACTIVE | COMMUNITY

## 2025-06-24 RX ORDER — CETIRIZINE HYDROCHLORIDE 10 MG/1
1 TABLET TABLET, FILM COATED ORAL ONCE A DAY
Status: ACTIVE | COMMUNITY

## 2025-06-24 RX ORDER — VEDOLIZUMAB 300 MG/5ML
AS DIRECTED INJECTION, POWDER, LYOPHILIZED, FOR SOLUTION INTRAVENOUS
Qty: 2 | Refills: 5 | Status: ACTIVE | COMMUNITY
Start: 2024-06-21 | End: 2025-06-27

## 2025-06-24 RX ORDER — VENLAFAXINE 75 MG/1
1 TABLET WITH FOOD TABLET ORAL ONCE A DAY
Qty: 90 TABLET | Refills: 0 | Status: ACTIVE | COMMUNITY

## 2025-06-24 RX ORDER — CYANOCOBALAMIN 1000 UG/ML
1 ML INJECTION INTRAMUSCULAR; SUBCUTANEOUS ONCE A DAY
Status: ACTIVE | COMMUNITY

## 2025-06-24 RX ORDER — POTASSIUM CHLORIDE 1500 MG/1
1 TABLET WITH FOOD TABLET, EXTENDED RELEASE ORAL ONCE A DAY
Qty: 90 TABLET | Status: ACTIVE | COMMUNITY

## 2025-06-24 RX ORDER — CHOLESTYRAMINE 4 G/9G
1 PACKET MIXED WITH WATER OR NON-CARBONATED DRINK POWDER, FOR SUSPENSION ORAL ONCE A DAY
Qty: 30 | Refills: 3 | Status: ACTIVE | COMMUNITY
Start: 2024-06-21

## 2025-06-24 RX ORDER — VEDOLIZUMAB 300 MG/5ML
AS DIRECTED INJECTION, POWDER, LYOPHILIZED, FOR SOLUTION INTRAVENOUS
Status: ACTIVE | COMMUNITY

## 2025-08-26 ENCOUNTER — OFFICE VISIT (OUTPATIENT)
Dept: URBAN - METROPOLITAN AREA CLINIC 43 | Facility: CLINIC | Age: 40
End: 2025-08-26
Payer: COMMERCIAL

## 2025-08-26 DIAGNOSIS — K50.90 CROHN DISEASE: ICD-10-CM

## 2025-08-26 PROCEDURE — 96413 CHEMO IV INFUSION 1 HR: CPT | Performed by: INTERNAL MEDICINE

## 2025-08-26 RX ORDER — POTASSIUM CHLORIDE 1500 MG/1
1 TABLET WITH FOOD TABLET, EXTENDED RELEASE ORAL ONCE A DAY
Qty: 90 TABLET | Status: ACTIVE | COMMUNITY

## 2025-08-26 RX ORDER — VEDOLIZUMAB 300 MG/5ML
AS DIRECTED INJECTION, POWDER, LYOPHILIZED, FOR SOLUTION INTRAVENOUS
Status: ACTIVE | COMMUNITY

## 2025-08-26 RX ORDER — CHOLESTYRAMINE 4 G/9G
1 PACKET MIXED WITH WATER OR NON-CARBONATED DRINK POWDER, FOR SUSPENSION ORAL ONCE A DAY
Qty: 30 | Refills: 3 | Status: ACTIVE | COMMUNITY
Start: 2024-06-21

## 2025-08-26 RX ORDER — CHOLECALCIFEROL TAB 50 MCG (2000 UNIT) 50 MCG
ONCE PER WEEK TAB ORAL
Status: ACTIVE | COMMUNITY

## 2025-08-26 RX ORDER — VENLAFAXINE 75 MG/1
1 TABLET WITH FOOD TABLET ORAL ONCE A DAY
Qty: 90 TABLET | Refills: 0 | Status: ACTIVE | COMMUNITY

## 2025-08-26 RX ORDER — DIPHENOXYLATE HYDROCHLORIDE AND ATROPINE SULFATE 2.5; .025 MG/1; MG/1
1 TABLET AS NEEDED TABLET ORAL
Status: ACTIVE | COMMUNITY

## 2025-08-26 RX ORDER — ONDANSETRON 4 MG/1
1 TABLET ON THE TONGUE AND ALLOW TO DISSOLVE TABLET, ORALLY DISINTEGRATING ORAL ONCE A DAY
Qty: 30 | Refills: 3 | Status: ACTIVE | COMMUNITY
Start: 2024-06-21

## 2025-08-26 RX ORDER — CETIRIZINE HYDROCHLORIDE 10 MG/1
1 TABLET TABLET, FILM COATED ORAL ONCE A DAY
Status: ACTIVE | COMMUNITY

## 2025-08-26 RX ORDER — TEDUGLUTIDE 5 MG
AS DIRECTED KIT SUBCUTANEOUS
Status: ACTIVE | COMMUNITY

## 2025-08-26 RX ORDER — CYANOCOBALAMIN 1000 UG/ML
1 ML INJECTION INTRAMUSCULAR; SUBCUTANEOUS ONCE A DAY
Status: ACTIVE | COMMUNITY

## 2025-08-26 RX ORDER — MULTIVIT-MIN/FOLIC/VIT K/LYCOP 400-300MCG
AS DIRECTED TABLET ORAL ONCE A DAY
Status: ACTIVE | COMMUNITY